# Patient Record
Sex: MALE | Race: WHITE | NOT HISPANIC OR LATINO | Employment: OTHER | URBAN - METROPOLITAN AREA
[De-identification: names, ages, dates, MRNs, and addresses within clinical notes are randomized per-mention and may not be internally consistent; named-entity substitution may affect disease eponyms.]

---

## 2019-10-03 ENCOUNTER — OFFICE VISIT CONVERTED (OUTPATIENT)
Dept: ORTHOPEDIC SURGERY | Facility: CLINIC | Age: 49
End: 2019-10-03
Attending: ORTHOPAEDIC SURGERY

## 2019-10-03 ENCOUNTER — CONVERSION ENCOUNTER (OUTPATIENT)
Dept: ORTHOPEDIC SURGERY | Facility: CLINIC | Age: 49
End: 2019-10-03

## 2019-11-14 ENCOUNTER — OFFICE VISIT CONVERTED (OUTPATIENT)
Dept: ORTHOPEDIC SURGERY | Facility: CLINIC | Age: 49
End: 2019-11-14
Attending: PHYSICIAN ASSISTANT

## 2020-06-25 ENCOUNTER — OFFICE VISIT CONVERTED (OUTPATIENT)
Dept: ORTHOPEDIC SURGERY | Facility: CLINIC | Age: 50
End: 2020-06-25
Attending: ORTHOPAEDIC SURGERY

## 2020-06-25 ENCOUNTER — CONVERSION ENCOUNTER (OUTPATIENT)
Dept: ORTHOPEDIC SURGERY | Facility: CLINIC | Age: 50
End: 2020-06-25

## 2020-08-18 ENCOUNTER — OFFICE VISIT CONVERTED (OUTPATIENT)
Dept: ORTHOPEDIC SURGERY | Facility: CLINIC | Age: 50
End: 2020-08-18
Attending: ORTHOPAEDIC SURGERY

## 2020-09-02 ENCOUNTER — HOSPITAL ENCOUNTER (OUTPATIENT)
Dept: OTHER | Facility: HOSPITAL | Age: 50
Discharge: HOME | End: 2020-09-02
Attending: ORTHOPAEDIC SURGERY

## 2020-09-03 ENCOUNTER — CONVERSION ENCOUNTER (OUTPATIENT)
Dept: ORTHOPEDIC SURGERY | Facility: CLINIC | Age: 50
End: 2020-09-03

## 2020-09-03 ENCOUNTER — OFFICE VISIT CONVERTED (OUTPATIENT)
Dept: ORTHOPEDIC SURGERY | Facility: CLINIC | Age: 50
End: 2020-09-03
Attending: ORTHOPAEDIC SURGERY

## 2021-05-03 ENCOUNTER — HOSPITAL ENCOUNTER (OUTPATIENT)
Dept: LAB | Facility: HOSPITAL | Age: 51
Discharge: HOME | End: 2021-05-03
Attending: NURSE PRACTITIONER

## 2021-05-03 ENCOUNTER — OFFICE VISIT CONVERTED (OUTPATIENT)
Dept: GASTROENTEROLOGY | Facility: CLINIC | Age: 51
End: 2021-05-03
Attending: NURSE PRACTITIONER

## 2021-05-03 LAB
C DIFF TOX B STL QL CT TISS CULT: NEGATIVE
CONV 027 TOXIN: NEGATIVE

## 2021-05-06 LAB — ELASTASE PANC STL-MCNT: 341 UG ELAST./G

## 2021-05-11 NOTE — H&P
History and Physical      Patient Name: Raciel Mancini   Patient ID: [id number]   Sex: Male   YOB: 1970    Primary Care Provider: Mar Oliver MD   Referring Provider: Mar Oliver MD    Visit Date: May 3, 2021    Provider: DINO Vanegas   Location: [facility]   Location Address: [address] [place] [address], [address]  [place], [place]  [id number]   Location Phone: [phone]          Chief Complaint  · Diarrhea      History Of Present Illness  The patient is a 50 year old /White male who presents on referral from Mar Oliver MD for a gastroenterology evaluation.      He presents for evaluation of diarrhea.      Diarrhea has been present for several months.  States that when he eats, has a bowel movement within 20 minutes.  Reports up to four bowel movements per day.  Denies rectal bleeding. States that stool is most like a #5-6 on the Parker stool chart.  Denies abdominal pain.  Denies change in diet and medication since diarrhea started.      Hemorrhoidectomy/colonoscopy - 2017 per Dr. Gaines.  Internal and external hemorrhoids removed.  Normal colonoscopy.     Denies family history of colon cancer.  Weight stable.      Denies GERD and dysphagia.             Past Medical History  GERD; High cholesterol; Hyperlipemia; Rectal bleeding         Past Surgical History  Hemorrhoidectomy         Medication List  Fish Oil oral; meloxicam 7.5 mg oral tablet; simvastatin 20 mg oral tablet         Allergy List  NO KNOWN DRUG ALLERGIES       Allergies Reconciled  Family Medical History  Cancer, Unspecified; Diabetes, unspecified type; Prostate cancer; Bladder calculus; Family history of Arthritis         Social History  Alcohol (Current some day); Alcohol Use (Current some day); Caffeine (Current every day); lives with spouse; .; Recreational Drug Use (Never); Second hand smoke exposure (Never); Tobacco (Former); Working         Review of  "Systems  · Constitutional  o Denies  o : chills, fever  · Eyes  o Denies  o : blurred vision, changes in vision  · Cardiovascular  o Denies  o : chest pain, irregular heart beats  · Respiratory  o Denies  o : shortness of breath, cough  · Gastrointestinal  o Admits  o : See HPI  · Genitourinary  o Denies  o : dysuria, blood in urine  · Integument  o Denies  o : rash, new skin lesions  · Neurologic  o Denies  o : tingling or numbness, seizures  · Musculoskeletal  o Denies  o : joint pain, joint swelling  · Endocrine  o Denies  o : weight gain, weight loss  · Psychiatric  o Denies  o : anxiety, depression      Vitals  Date Time BP Position Site L\R Cuff Size HR RR TEMP (F) WT  HT  BMI kg/m2 BSA m2 O2 Sat FR L/min FiO2 HC       05/03/2021 08:38 /77 Sitting       202lbs 0oz 5'  11\" 28.17 2.14             Physical Examination  · Constitutional  o Appearance  o : well developed, well-nourished, in no acute distress  · Eyes  o Vision  o :   § Visual Fields  § : eyes move symmetrical in all directions  o Sclerae  o : anicteric  o Pupils and Irises  o : pupils equal and symmetrical  · Neck  o Inspection/Palpation  o : supple  · Respiratory  o Respiratory Effort  o : breathing unlabored  o Inspection of Chest  o : normal appearance, no retractions  o Auscultation of Lungs  o : clear to auscultation bilaterally  · Cardiovascular  o Heart  o :   § Auscultation of Heart  § : no murmurs, gallops or rubs  · Gastrointestinal  o Abdominal Examination  o : soft, nontender to palpation, with normal active bowel sounds, no appreciable hepatosplenomegaly  o Digital Rectal Exam  o : deferred  · Lymphatic  o Neck  o : no palpable lymphadenopathy  · Skin and Subcutaneous Tissue  o General Inspection  o : without focal lesions; turgor is normal  · Psychiatric  o General  o : Alert and oriented x3  o Mood and Affect  o : Mood and affect are appropriate to circumstances  · Extremities  o Extremities  o : No edema, no " cyanosis          Assessment  · Diarrhea     787.91/R19.7  · Change in bowel habits     787.99/R19.4      Plan  · Orders  o C difficile Toxigenic Assay (PCR) [facility] (66700) - - 05/03/2021  o Stool culture and sensitivity (45145) - - 05/03/2021  o Fecal Occult Blood Diagnostic (Send to Lab) [facility] (20776) - - 05/03/2021  o Giardia and Cryptosporidium Enzyme Immunoassay [facility] (07842, 71835) - - 05/03/2021  o Lactoferrin (Fecal) Qualitative (40245) - - 05/03/2021  o Pancreatic elastase stool (69553) - - 05/03/2021  · Medications  o Florajen3 460 mg (7.5-6- 1.5 bill. cell) oral capsule   SIG: take 1 capsule by oral route daily for 30 days   DISP: (30) Capsule with 5 refills  Prescribed on 05/03/2021     o Medications have been Reconciled  · Instructions  o Information given on current diagnoses. If stool studies unrevealing, recommend colonoscopy.  · Disposition  o Follow up 3 months  · Referrals  o ID: [id number] Date: 04/30/2021 Type: Inbound  Specialty: Gastroenterology            Electronically Signed by: DINO Vanegas -Author on May 3, 2021 08:55:48 AM

## 2021-05-13 NOTE — PROGRESS NOTES
Progress Note      Patient Name: Raciel Mancini   Patient ID: [id number]   Sex: Male   YOB: 1970        Visit Date: June 25, 2020    Provider: Kurtis Dias MD   Location: [facility]   Location Address: [address] [place] [address]  [id number]   Location Phone: [phone]          Chief Complaint  · Bilateral elbow pain      History Of Present Illness  Raciel Mancini is a 50 year old /White male who presents today to [facility].      The patient presents today for follow up with bilateral elbow pain.   The patient recently had an EMG in February.  He reports numbness and tingling in his left hand in the middle of the night.            Past Medical History  GERD; High cholesterol; Hyperlipemia; Rectal bleeding         Past Surgical History  Hemorrhoidectomy         Medication List  Fish Oil oral; meloxicam 7.5 mg oral tablet; simvastatin 20 mg oral tablet         Allergy List  NO KNOWN DRUG ALLERGIES         Family Medical History  Cancer, Unspecified; Diabetes, unspecified type; Prostate cancer; Bladder calculus; Family history of Arthritis         Social History  Alcohol (Current some day); Alcohol Use (Current some day); Caffeine (Current every day); lives with spouse; .; Recreational Drug Use (Never); Second hand smoke exposure (Never); Tobacco (Former); Working         Review of Systems  · Constitutional  o Denies  o : fever, chills, weight loss  · Cardiovascular  o Denies  o : chest pain, shortness of breath  · Gastrointestinal  o Denies  o : liver disease, heartburn, nausea, blood in stools  · Genitourinary  o Denies  o : painful urination, blood in urine  · Integument  o Denies  o : rash, itching  · Neurologic  o Denies  o : headache, weakness, loss of consciousness  · Musculoskeletal  o Denies  o : painful, swollen joints  · Psychiatric  o Denies  o : drug/alcohol addiction, anxiety, depression      Vitals  Date Time BP Position Site L\R Cuff  "Size HR RR TEMP (F) WT  HT  BMI kg/m2 BSA m2 O2 Sat HC       06/25/2020 03:07 PM         194lbs 16oz 5'  11\" 27.2 2.1           Physical Examination  · Constitutional  o Appearance  o : well developed, well-nourished, no obvious deformities present  · Head and Face  o Head  o :   § Inspection  § : normocephalic  o Face  o :   § Inspection  § : no facial lesions  · Eyes  o Conjunctivae  o : conjunctivae normal  o Sclerae  o : sclerae white  · Ears, Nose, Mouth and Throat  o Ears  o :   § External Ears  § : appearance within normal limits  § Hearing  § : intact  o Nose  o :   § External Nose  § : appearance normal  · Neck  o Inspection/Palpation  o : normal appearance  o Range of Motion  o : full range of motion  · Respiratory  o Respiratory Effort  o : breathing unlabored  o Inspection of Chest  o : normal appearance  o Auscultation of Lungs  o : no audible wheezing or rales  · Cardiovascular  o Heart  o : regular rate  · Gastrointestinal  o Abdominal Examination  o : soft and non-tender  · Skin and Subcutaneous Tissue  o General Inspection  o : intact, no rashes  · Psychiatric  o General  o : Alert and oriented x3  o Judgement and Insight  o : judgment and insight intact  o Mood and Affect  o : mood normal, affect appropriate  · Extremities  o Extremities  o : Bilateral elbows L Tenderness over the lateral upper condyle on the left elbow, sensation is intact, neurovascularly intact. R Sensation is intact  · Injection Note/Aspiration Note  o Site  o : left elbow  o Procedure  o : Procedure: After educating the patient, patient gave consent for procedure. After using Chloraprep, the joint space was injected. The patient tolerated the procedure well.  o Medication  o : 80 mg of DepoMedrol with 1cc of 1% Lidocaine              Assessment  · Carpal Tunnel Syndrome     354.0/G56.00  · Pain of both elbows       Pain in right elbow     719.42/M25.521  Pain in left elbow     719.42/M25.522  · Neuropathy: ulnar " nerve     355.9/G62.9  · Cubital syndrome     726.39/M70.30  · Epicondylitis, bi- lateral     726.32/M77.10      Plan  · Orders  o Depo-Medrol injection 80mg () - - 06/25/2020   Lot [id number] Exp 06 2021 [employer] Administered by MICHEL MARTINEZ MD  o Elbow-Lat Single Tendon (Injection) (09829) - - 06/25/2020   Lot [id number] Exp 07 01 2021 [employer] Administered by MICHEL MARTINEZ MD  · Medications  o Medications have been Reconciled  o Transition of Care or Provider Policy  · Instructions  o Dr. Martinez saw and examined the patient and agrees with plan.   o Reviewed the patient's Past Medical, Social, and Family history as well as the ROS at today's visit, no changes.  o Call or return if worsening symptoms.  o The above service was scribed by Cruz Pitts on my behalf and I attest to the accuracy of the note. jsb  o We discussed the plan with the patient. We discussed doing injections for the tennis elbow, but surgery would be needed to fix his pinched nerves in the left arm. Plan for injection today in the left and the patient will wait a little bit for surgery, until symptoms get worse. The patient will follow up in a week for the right one. Plan for PT as well.  o Electronically Identified Patient Education Materials Provided Electronically  · Referrals  o ID: [id number] Date: 06/25/2020 Type: Inbound  Specialty: Orthopedic Surgery            Electronically Signed by: Xiang Pitts - , Other -Author on June 29, 2020 11:10:29 AM  Electronically Co-signed by: Michel Martinez MD -Reviewer on June 29, 2020 09:33:44 PM

## 2021-05-13 NOTE — PROGRESS NOTES
Progress Note      Patient Name: Raciel Mancini   Patient ID: [id number]   Sex: Male   YOB: 1970        Visit Date: September 3, 2020    Provider: Kurtis Dias MD   Location: [facility]   Location Address: [address] [place] [address]  [id number]   Location Phone: [phone]          Chief Complaint  · Left elbow pain      History Of Present Illness  Raciel Mancini is a 50 year old /White male who presents today to [facility].      The patient presents today for follow-up of left elbow, follow-up MRI. Patient has had left elbow pain over the past 2 years. He has had tried treatments including 3 steroid injections, he states the last injection did not help his elbow pain at all. Patient has been attending physical therapy 3 times per week since his last visit with no relief. Patient takes Aleve occasionally with some relief of his pain.    He reports the elbow continues to be painful over the lateral elbow.       Past Medical History  GERD; High cholesterol; Hyperlipemia; Rectal bleeding         Past Surgical History  Hemorrhoidectomy         Medication List  Fish Oil oral; meloxicam 7.5 mg oral tablet; simvastatin 20 mg oral tablet         Allergy List  NO KNOWN DRUG ALLERGIES       Allergies Reconciled  Family Medical History  Cancer, Unspecified; Diabetes, unspecified type; Prostate Cancer; Bladder calculus; Family history of Arthritis         Social History  Alcohol (Current some day); Alcohol Use (Current some day); Caffeine (Current every day); lives with spouse; .; Recreational Drug Use (Never); Second hand smoke exposure (Never); Tobacco (Former); Working         Review of Systems  · Constitutional  o Denies  o : fever, chills, weight loss  · Cardiovascular  o Denies  o : chest pain, shortness of breath  · Gastrointestinal  o Denies  o : liver disease, heartburn, nausea, blood in stools  · Genitourinary  o Denies  o : painful urination,  "blood in urine  · Integument  o Denies  o : rash, itching  · Neurologic  o Denies  o : headache, weakness, loss of consciousness  · Musculoskeletal  o Denies  o : painful, swollen joints  · Psychiatric  o Denies  o : drug/alcohol addiction, anxiety, depression      Vitals  Date Time BP Position Site L\R Cuff Size HR RR TEMP (F) WT  HT  BMI kg/m2 BSA m2 O2 Sat        09/03/2020 10:11 AM         195lbs 16oz 5'  11\" 27.34 2.11           Physical Examination  · Constitutional  o Appearance  o : well developed, well-nourished, no obvious deformities present  · Head and Face  o Head  o :   § Inspection  § : normocephalic  o Face  o :   § Inspection  § : no facial lesions  · Eyes  o Conjunctivae  o : conjunctivae normal  o Sclerae  o : sclerae white  · Ears, Nose, Mouth and Throat  o Ears  o :   § External Ears  § : appearance within normal limits  § Hearing  § : intact  o Nose  o :   § External Nose  § : appearance normal  · Neck  o Inspection/Palpation  o : normal appearance  o Range of Motion  o : full range of motion  · Respiratory  o Respiratory Effort  o : breathing unlabored  o Inspection of Chest  o : normal appearance  o Auscultation of Lungs  o : no audible wheezing or rales  · Cardiovascular  o Heart  o : regular rate  · Gastrointestinal  o Abdominal Examination  o : soft and non-tender  · Skin and Subcutaneous Tissue  o General Inspection  o : intact, no rashes  · Psychiatric  o General  o : Alert and oriented x3  o Judgement and Insight  o : judgment and insight intact  o Mood and Affect  o : mood normal, affect appropriate  · Left Elbow  o Inspection  o : Tender to palpation of the lateral elbow pain with resisted wrist extension, pain with resisted third finger extension, extension -10, flexion 130, neurovascularly intact, 2+ radial and ulnar pulses   · Imaging  o Imaging  o : [place] [facility] [place]: 1. Moderate common extensor tendinopathy with partial-thickness undersurface tearing at the lateral epicondyle. 2. " Mild partial-thickness sprain of the proximal RCL complex.          Assessment  · Lateral epicondylitis     726.32/M77.10  · Left elbow pain     719.42/M25.522      Plan  · Medications  o Medications have been Reconciled  o Transition of Care or Provider Policy  · Instructions  o Reviewed the patient's Past Medical, Social, and Family history as well as the ROS at today's visit, no changes.  o Call or return if worsening symptoms.  o The above service was scribed by Viridiana Branch on my behalf and I attest to the accuracy of the note. jsb  o We discussed conservative treatment options with the patient. He has tried injections, medications, bracing, etc. He is not interested in surgical intervention. He wished to proceed with a PRP injection and follow-up at our office for this when available.   · Referrals  o ID: [id number] Date: 10/03/2019 Type: Inbound  Specialty: Orthopedic Surgery            Electronically Signed by: Viridiana Branch-, Other -Author on September 3, 2020 10:27:03 AM  Electronically Co-signed by: Kurtis Dias MD -Reviewer on September 3, 2020 10:19:21 PM

## 2021-05-14 VITALS — HEIGHT: 71 IN | WEIGHT: 195.12 LBS | BODY MASS INDEX: 27.31 KG/M2

## 2021-05-14 VITALS
BODY MASS INDEX: 28.28 KG/M2 | HEIGHT: 71 IN | DIASTOLIC BLOOD PRESSURE: 77 MMHG | WEIGHT: 202 LBS | SYSTOLIC BLOOD PRESSURE: 122 MMHG

## 2021-05-14 VITALS — BODY MASS INDEX: 27.44 KG/M2 | HEIGHT: 71 IN | WEIGHT: 196 LBS

## 2021-05-15 VITALS — HEIGHT: 71 IN | WEIGHT: 204.25 LBS | HEART RATE: 83 BPM | OXYGEN SATURATION: 98 % | BODY MASS INDEX: 28.6 KG/M2

## 2021-05-15 VITALS — OXYGEN SATURATION: 97 % | WEIGHT: 197 LBS | HEIGHT: 71 IN | HEART RATE: 74 BPM | BODY MASS INDEX: 27.58 KG/M2

## 2021-05-15 VITALS — WEIGHT: 195 LBS | HEIGHT: 71 IN | BODY MASS INDEX: 27.3 KG/M2

## 2021-06-16 ENCOUNTER — TELEPHONE (OUTPATIENT)
Dept: URGENT CARE | Facility: CLINIC | Age: 51
End: 2021-06-16
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
## 2021-06-16 DIAGNOSIS — S20.361A TICK BITE OF RIGHT SIDE OF CHEST WALL, INITIAL ENCOUNTER: ICD-10-CM
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
## 2021-06-16 PROCEDURE — 0097U HC BIOFIRE FILMARRAY GI PANEL: CPT | Performed by: FAMILY MEDICINE

## 2021-06-16 PROCEDURE — 87493 C DIFF AMPLIFIED PROBE: CPT | Performed by: FAMILY MEDICINE

## 2021-06-16 PROCEDURE — 83630 LACTOFERRIN FECAL (QUAL): CPT | Performed by: FAMILY MEDICINE

## 2021-06-16 PROCEDURE — 86757 RICKETTSIA ANTIBODY: CPT | Performed by: FAMILY MEDICINE

## 2021-06-16 PROCEDURE — 86618 LYME DISEASE ANTIBODY: CPT | Performed by: FAMILY MEDICINE

## 2021-06-16 PROCEDURE — 86666 EHRLICHIA ANTIBODY: CPT | Performed by: FAMILY MEDICINE

## 2021-06-16 NOTE — TELEPHONE ENCOUNTER
Patient called, reports that Lyme titer and RMSF labs performed at another facility had a specimen that clotted and therefore needs to be redrawn.  He prefers to have them performed here and submitted in addition to the lab he had this AM.  Patient understands he may need to come back in for redraw.    (see note addendum too).    Patient was recontacted and will come back for redraw.

## 2021-06-17 LAB
B BURGDOR IGG SER QL: NEGATIVE
B BURGDOR IGM SER QL: NEGATIVE

## 2021-06-21 LAB
R RICKETTSI IGG SER QL IA: POSITIVE
R RICKETTSI IGG TITR SER IF: ABNORMAL {TITER}
R RICKETTSI IGM SER-ACNC: 0.25 INDEX (ref 0–0.89)

## 2021-06-24 ENCOUNTER — TELEPHONE (OUTPATIENT)
Dept: URGENT CARE | Facility: CLINIC | Age: 51
End: 2021-06-24

## 2021-06-24 NOTE — TELEPHONE ENCOUNTER
----- Message from Nadia Martinez MD sent at 6/22/2021 10:34 PM EDT -----  Erlichia result negative too, please notify patient of this final result.

## 2021-06-25 ENCOUNTER — TELEPHONE (OUTPATIENT)
Dept: URGENT CARE | Facility: CLINIC | Age: 51
End: 2021-06-25

## 2021-07-02 ENCOUNTER — HOSPITAL ENCOUNTER (OUTPATIENT)
Facility: HOSPITAL | Age: 51
Setting detail: HOSPITAL OUTPATIENT SURGERY
End: 2021-07-02
Attending: INTERNAL MEDICINE | Admitting: INTERNAL MEDICINE

## 2021-07-02 ENCOUNTER — CLINICAL SUPPORT (OUTPATIENT)
Dept: GASTROENTEROLOGY | Facility: CLINIC | Age: 51
End: 2021-07-02

## 2021-07-02 ENCOUNTER — PREP FOR SURGERY (OUTPATIENT)
Dept: OTHER | Facility: HOSPITAL | Age: 51
End: 2021-07-02

## 2021-07-02 DIAGNOSIS — Z12.11 SCREEN FOR COLON CANCER: Primary | ICD-10-CM

## 2021-07-02 PROBLEM — E78.5 HYPERLIPEMIA: Status: ACTIVE | Noted: 2021-07-02

## 2021-07-02 PROBLEM — K62.5 RECTAL BLEEDING: Status: ACTIVE | Noted: 2021-07-02

## 2021-07-02 RX ORDER — DICLOFENAC SODIUM 75 MG/1
TABLET, DELAYED RELEASE ORAL EVERY 12 HOURS SCHEDULED
COMMUNITY
End: 2021-08-03

## 2021-07-02 RX ORDER — PEG-3350, SODIUM SULFATE, SODIUM CHLORIDE, POTASSIUM CHLORIDE, SODIUM ASCORBATE AND ASCORBIC ACID 7.5-2.691G
1000 KIT ORAL EVERY 12 HOURS
Qty: 1000 ML | Refills: 0 | Status: SHIPPED | OUTPATIENT
Start: 2021-07-02 | End: 2021-09-23 | Stop reason: HOSPADM

## 2021-08-03 ENCOUNTER — OFFICE VISIT (OUTPATIENT)
Dept: GASTROENTEROLOGY | Facility: CLINIC | Age: 51
End: 2021-08-03

## 2021-08-03 VITALS
BODY MASS INDEX: 28.45 KG/M2 | WEIGHT: 204 LBS | HEART RATE: 71 BPM | SYSTOLIC BLOOD PRESSURE: 117 MMHG | DIASTOLIC BLOOD PRESSURE: 78 MMHG

## 2021-08-03 DIAGNOSIS — R19.7 DIARRHEA, UNSPECIFIED TYPE: Primary | ICD-10-CM

## 2021-08-03 DIAGNOSIS — R15.2 FECAL URGENCY: ICD-10-CM

## 2021-08-03 PROCEDURE — 99213 OFFICE O/P EST LOW 20 MIN: CPT | Performed by: NURSE PRACTITIONER

## 2021-08-03 NOTE — PROGRESS NOTES
Chief Complaint  Follow-up (diarrhea)    Raciel Mancini is a 51 y.o. male who presents to [facility] for follow-up of diarrhea.  History of present Illness  He's continuing to have diarrhea, but it's somewhat improved.  States that stool is not formed.  He continues to experience fecal urgecny with meals and coffee.    He has not tried dicyclomine in the past but states that he does not like to take medications unless they are absolutely necessary.  Reports taking Mobic daily for arthritic pain.    He is scheduled for colonoscopy in October to further work-up etiology of diarrhea.      Past Medical History:   Diagnosis Date   • Arthritis    • GERD (gastroesophageal reflux disease)    • High cholesterol    • Hyperlipemia    • Hyperlipidemia    • Rectal bleeding        Past Surgical History:   Procedure Laterality Date   • COLONOSCOPY     • HEMORRHOIDECTOMY           Current Outpatient Medications:   •  meloxicam (MOBIC) 7.5 MG tablet, meloxicam 7.5 mg oral tablet take 1 tablet (7.5 mg) by oral route once daily   Active, Disp: , Rfl:   •  Omega-3 Fatty Acids (fish oil) 1000 MG capsule capsule, Take  by mouth., Disp: , Rfl:   •  PEG-KCl-NaCl-NaSulf-Na Asc-C (MoviPrep) 100 g reconstituted solution powder, Take 1,000 mL by mouth Every 12 (Twelve) Hours., Disp: 1000 mL, Rfl: 0  •  simvastatin (ZOCOR) 20 MG tablet, , Disp: , Rfl:      No Known Allergies    Family History   Problem Relation Age of Onset   • Diabetes Mother    • Other Mother         Bladder stones   • Arthritis Mother    • Prostate cancer Father    • Arthritis Father    • Cancer Brother    • Diabetes Brother    • Arthritis Brother    • Prostate cancer Paternal Grandfather         Social History     Social History Narrative   • Not on file       Objective     Review of Systems   Constitutional: Negative for fever and unexpected weight loss.   Respiratory: Negative for cough and shortness of breath.    Cardiovascular:  Negative for chest pain and palpitations.   Gastrointestinal:        See HPI   Neurological: Negative for weakness and confusion.        Vital Signs:   /78 (BP Location: Left arm, Patient Position: Sitting, Cuff Size: Large Adult)   Pulse 71   Wt 92.5 kg (204 lb)   BMI 28.45 kg/m²       Physical Exam  Constitutional:       General: He is not in acute distress.     Appearance: Normal appearance. He is well-developed and normal weight.   HENT:      Head: Normocephalic and atraumatic.   Eyes:      Conjunctiva/sclera: Conjunctivae normal.      Pupils: Pupils are equal, round, and reactive to light.      Visual Fields: Right eye visual fields normal and left eye visual fields normal.   Cardiovascular:      Rate and Rhythm: Normal rate and regular rhythm.      Heart sounds: Normal heart sounds.   Pulmonary:      Effort: Pulmonary effort is normal. No retractions.      Breath sounds: Normal breath sounds and air entry.   Abdominal:      General: Bowel sounds are normal.      Palpations: Abdomen is soft.      Tenderness: There is no abdominal tenderness.      Comments: No appreciable hepatosplenomegaly or ascites   Musculoskeletal:         General: Normal range of motion.      Cervical back: Neck supple.      Right lower leg: No edema.      Left lower leg: No edema.   Lymphadenopathy:      Cervical: No cervical adenopathy.   Skin:     General: Skin is warm and dry.      Findings: No lesion.   Neurological:      General: No focal deficit present.      Mental Status: He is alert and oriented to person, place, and time.   Psychiatric:         Mood and Affect: Mood and affect normal.         Behavior: Behavior normal.         Result Review :   The following data was reviewed by: DINO Christie on 08/03/2021:  Gastrointestinal Panel, PCR - Stool, Per Rectum (06/16/2021 11:32)  Clostridium Difficile Toxin, PCR - , (05/03/2021 11:39)  Pancreatic Elastase, Fecal - , (05/03/2021 11:39)  Ehrlichia Antibody Panel  (06/16/2021 11:31)  Specimen Status Report (06/16/2021 11:31)  Ehrlichia Antibody Panel (06/16/2021 11:31)  Clostridium Difficile Toxin - Stool, Per Rectum (06/16/2021 11:32)  Fecal Lactoferrin - Stool, Per Rectum (06/16/2021 11:32)                     Assessment and Plan    Diagnoses and all orders for this visit:    1. Diarrhea, unspecified type (Primary)    2. Fecal urgency      Discussed with patient recommendation to discontinue Mobic if possible as this could be causing diarrhea.  Also discussed daily fiber supplement.  Patient would like to try discontinuing NSAIDs first.  Will await upcoming colonoscopy.          Follow Up   Return for F/U after procedure.  Patient was given instructions and counseling regarding his condition or for health maintenance advice. Please see specific information pulled into the AVS if appropriate.

## 2021-08-11 ENCOUNTER — TELEPHONE (OUTPATIENT)
Dept: GASTROENTEROLOGY | Facility: CLINIC | Age: 51
End: 2021-08-11

## 2021-09-07 ENCOUNTER — TELEPHONE (OUTPATIENT)
Dept: GASTROENTEROLOGY | Facility: CLINIC | Age: 51
End: 2021-09-07

## 2021-09-07 NOTE — TELEPHONE ENCOUNTER
Due to covid, patient will be added to a list and will need to be rescheduled at a later time. Patient informed of this information.”

## 2021-09-08 ENCOUNTER — TELEPHONE (OUTPATIENT)
Dept: GASTROENTEROLOGY | Facility: CLINIC | Age: 51
End: 2021-09-08

## 2021-09-08 NOTE — TELEPHONE ENCOUNTER
Due to covid, patient will be added to a list and will need to be rescheduled at a later time. Patient informed of this information.

## 2021-09-14 ENCOUNTER — PREP FOR SURGERY (OUTPATIENT)
Dept: OTHER | Facility: HOSPITAL | Age: 51
End: 2021-09-14

## 2021-09-14 DIAGNOSIS — K62.5 RECTAL BLEEDING: ICD-10-CM

## 2021-09-14 DIAGNOSIS — R19.7 DIARRHEA, UNSPECIFIED TYPE: ICD-10-CM

## 2021-09-16 PROBLEM — R19.7 DIARRHEA: Status: ACTIVE | Noted: 2021-09-16

## 2021-09-23 ENCOUNTER — HOSPITAL ENCOUNTER (OUTPATIENT)
Facility: HOSPITAL | Age: 51
Setting detail: HOSPITAL OUTPATIENT SURGERY
Discharge: HOME | End: 2021-09-23
Attending: INTERNAL MEDICINE | Admitting: INTERNAL MEDICINE

## 2021-09-23 ENCOUNTER — ANESTHESIA (OUTPATIENT)
Dept: GASTROENTEROLOGY | Facility: HOSPITAL | Age: 51
End: 2021-09-23

## 2021-09-23 ENCOUNTER — ANESTHESIA EVENT (OUTPATIENT)
Dept: GASTROENTEROLOGY | Facility: HOSPITAL | Age: 51
End: 2021-09-23

## 2021-09-23 VITALS
DIASTOLIC BLOOD PRESSURE: 73 MMHG | BODY MASS INDEX: 27.92 KG/M2 | WEIGHT: 200.18 LBS | RESPIRATION RATE: 17 BRPM | TEMPERATURE: 97 F | OXYGEN SATURATION: 97 % | SYSTOLIC BLOOD PRESSURE: 97 MMHG | HEART RATE: 66 BPM

## 2021-09-23 DIAGNOSIS — K62.5 RECTAL BLEEDING: ICD-10-CM

## 2021-09-23 DIAGNOSIS — R19.7 DIARRHEA, UNSPECIFIED TYPE: ICD-10-CM

## 2021-09-23 PROCEDURE — 88305 TISSUE EXAM BY PATHOLOGIST: CPT | Performed by: INTERNAL MEDICINE

## 2021-09-23 PROCEDURE — 45380 COLONOSCOPY AND BIOPSY: CPT | Performed by: INTERNAL MEDICINE

## 2021-09-23 PROCEDURE — 25010000002 PROPOFOL 10 MG/ML EMULSION: Performed by: ANESTHESIOLOGY

## 2021-09-23 RX ORDER — SODIUM CHLORIDE, SODIUM LACTATE, POTASSIUM CHLORIDE, CALCIUM CHLORIDE 600; 310; 30; 20 MG/100ML; MG/100ML; MG/100ML; MG/100ML
30 INJECTION, SOLUTION INTRAVENOUS CONTINUOUS
Status: DISCONTINUED | OUTPATIENT
Start: 2021-09-23 | End: 2021-09-23 | Stop reason: HOSPADM

## 2021-09-23 RX ORDER — PROPOFOL 10 MG/ML
VIAL (ML) INTRAVENOUS AS NEEDED
Status: DISCONTINUED | OUTPATIENT
Start: 2021-09-23 | End: 2021-09-23 | Stop reason: SURG

## 2021-09-23 RX ORDER — LIDOCAINE HYDROCHLORIDE 20 MG/ML
INJECTION, SOLUTION INFILTRATION; PERINEURAL AS NEEDED
Status: DISCONTINUED | OUTPATIENT
Start: 2021-09-23 | End: 2021-09-23 | Stop reason: SURG

## 2021-09-23 RX ADMIN — SODIUM CHLORIDE, POTASSIUM CHLORIDE, SODIUM LACTATE AND CALCIUM CHLORIDE 30 ML/HR: 600; 310; 30; 20 INJECTION, SOLUTION INTRAVENOUS at 10:07

## 2021-09-23 RX ADMIN — PROPOFOL 100 MG: 10 INJECTION, EMULSION INTRAVENOUS at 10:23

## 2021-09-23 RX ADMIN — PROPOFOL 200 MCG/KG/MIN: 10 INJECTION, EMULSION INTRAVENOUS at 10:23

## 2021-09-23 RX ADMIN — LIDOCAINE HYDROCHLORIDE 30 MG: 20 INJECTION, SOLUTION INFILTRATION; PERINEURAL at 10:23

## 2021-09-23 NOTE — ANESTHESIA PREPROCEDURE EVALUATION
Anesthesia Evaluation     Patient summary reviewed and Nursing notes reviewed   no history of anesthetic complications:  NPO Solid Status: > 8 hours  NPO Liquid Status: > 2 hours           Airway   Mallampati: II  TM distance: >3 FB  Neck ROM: full  No difficulty expected  Dental - normal exam     Pulmonary - normal exam   (+) a smoker Former,   Cardiovascular - normal exam  Exercise tolerance: good (4-7 METS)    (+) hyperlipidemia,       Neuro/Psych- negative ROS  GI/Hepatic/Renal/Endo    (+)  GERD, GI bleeding lower ,     Musculoskeletal     Abdominal  - normal exam    Bowel sounds: normal.   Substance History - negative use     OB/GYN negative ob/gyn ROS         Other   arthritis,                      Anesthesia Plan    ASA 2     general   total IV anesthesia  intravenous induction     Anesthetic plan, all risks, benefits, and alternatives have been provided, discussed and informed consent has been obtained with: patient.

## 2021-09-23 NOTE — ANESTHESIA POSTPROCEDURE EVALUATION
Patient: Raciel Mancini    Procedure Summary     Date: 09/23/21 Room / Location: [facility] ENDOSCOPY 4 / [facility] ENDOSCOPY    Anesthesia Start: 1019 Anesthesia Stop: 1047    Procedure: COLONOSCOPY (N/A ) Diagnosis:       Diarrhea, unspecified type      Rectal bleeding      (Diarrhea, unspecified type [R19.7])      (Rectal bleeding [K62.5])    Surgeons: Hanane Tapia MD Provider: Berhane Morales MD    Anesthesia Type: general ASA Status: 2          Anesthesia Type: general    Vitals  Vitals Value Taken Time   BP 97/73 09/23/21 1100   Temp 36.1 °C (97 °F) 09/23/21 1100   Pulse 78 09/23/21 1101   Resp 17 09/23/21 1100   SpO2 96 % 09/23/21 1101   Vitals shown include unvalidated device data.        Post Anesthesia Care and Evaluation    Patient location during evaluation: PHASE II  Patient participation: complete - patient participated  Level of consciousness: awake and alert  Pain score: 0  Pain management: adequate  Airway patency: patent  Anesthetic complications: No anesthetic complications  PONV Status: none  Cardiovascular status: acceptable  Respiratory status: acceptable  Hydration status: acceptable  No anesthesia care post op

## 2021-09-23 NOTE — H&P
Pre Procedure History & Physical    Chief Complaint:   diarrhea    Subjective     HPI:   50 yo M here for eval of diarrhea.    Past Medical History:   Past Medical History:   Diagnosis Date   • Arthritis    • Diarrhea    • GERD (gastroesophageal reflux disease)    • High cholesterol    • Hyperlipemia    • Hyperlipidemia    • Nerve damage     bilateral wrists   • Rectal bleeding        Past Surgical History:  Past Surgical History:   Procedure Laterality Date   • COLONOSCOPY     • HEMORRHOIDECTOMY         Family History:  Family History   Problem Relation Age of Onset   • Diabetes Mother    • Other Mother         Bladder stones   • Arthritis Mother    • Prostate cancer Father    • Arthritis Father    • Cancer Brother    • Diabetes Brother    • Arthritis Brother    • Prostate cancer Paternal Grandfather        Social History:   reports that he has quit smoking. He has never used smokeless tobacco. He reports current alcohol use of about 1.0 standard drinks of alcohol per week. He reports that he does not use drugs.    Medications:   Medications Prior to Admission   Medication Sig Dispense Refill Last Dose   • Omega-3 Fatty Acids (fish oil) 1000 MG capsule capsule Take 1,000 mg by mouth Daily With Breakfast.   Unknown at Unknown time   • PEG-KCl-NaCl-NaSulf-Na Asc-C (MoviPrep) 100 g reconstituted solution powder Take 1,000 mL by mouth Every 12 (Twelve) Hours. 1000 mL 0 Unknown at Unknown time   • simvastatin (ZOCOR) 20 MG tablet Take 20 mg by mouth Every Night.   Unknown at Unknown time       Allergies:  Patient has no known allergies.    ROS:    Pertinent items are noted in HPI     Objective     Weight 90.8 kg (200 lb 2.8 oz).    Physical Exam   Constitutional: Pt is oriented to person, place, and time and well-developed, well-nourished, and in no distress.   Mouth/Throat: Oropharynx is clear and moist.   Neck: Normal range of motion.   Cardiovascular: Normal rate, regular rhythm and normal heart sounds.     Pulmonary/Chest: Effort normal and breath sounds normal.   Abdominal: Soft. Nontender  Skin: Skin is warm and dry.   Psychiatric: Mood, memory, affect and judgment normal.     Assessment/Plan     Diagnosis:  Diarrhea    Anticipated Surgical Procedure:  Colonoscopy    The risks, benefits, and alternatives of this procedure have been discussed with the patient or the responsible party- the patient understands and agrees to proceed.

## 2021-09-24 LAB
CYTO UR: NORMAL
LAB AP CASE REPORT: NORMAL
LAB AP CLINICAL INFORMATION: NORMAL
PATH REPORT.FINAL DX SPEC: NORMAL
PATH REPORT.GROSS SPEC: NORMAL

## 2021-09-29 ENCOUNTER — TELEPHONE (OUTPATIENT)
Dept: GASTROENTEROLOGY | Facility: CLINIC | Age: 51
End: 2021-09-29

## 2021-09-29 NOTE — TELEPHONE ENCOUNTER
----- Message from Estela Natino sent at 9/29/2021  9:10 AM EDT -----    ----- Message -----  From: Hanane Tapia MD  Sent: 9/28/2021   9:05 PM EDT  To: Estela Nation    Recall colonoscopy in 10 years.    Normal colon biopsies.  Please arrange f/u appointment.

## 2021-09-29 NOTE — TELEPHONE ENCOUNTER
Since colonoscopy and stool studies were all normal, I feel that diarrhea is likely related to IBS-D.  Has he tried dicyclomine that was previously prescribed?  If so, would recommend that we begin him on Colestid to help with diarrhea.

## 2021-09-29 NOTE — TELEPHONE ENCOUNTER
Patient notified of results and placed in recall system.  He is concerned because he is still having a lot of diarrhea, he states that any time he eats within 20-30 minutes he has diarrhea. Please advise.

## 2021-09-30 RX ORDER — DICYCLOMINE HCL 20 MG
20 TABLET ORAL EVERY 6 HOURS PRN
Qty: 120 TABLET | Refills: 1 | Status: SHIPPED | OUTPATIENT
Start: 2021-09-30

## 2022-02-24 ENCOUNTER — OFFICE VISIT (OUTPATIENT)
Dept: GASTROENTEROLOGY | Facility: CLINIC | Age: 52
End: 2022-02-24

## 2022-02-24 ENCOUNTER — LAB (OUTPATIENT)
Dept: LAB | Facility: HOSPITAL | Age: 52
End: 2022-02-24

## 2022-02-24 VITALS
BODY MASS INDEX: 29.12 KG/M2 | HEIGHT: 71 IN | DIASTOLIC BLOOD PRESSURE: 73 MMHG | HEART RATE: 70 BPM | WEIGHT: 208 LBS | SYSTOLIC BLOOD PRESSURE: 124 MMHG

## 2022-02-24 DIAGNOSIS — K58.0 IRRITABLE BOWEL SYNDROME WITH DIARRHEA: ICD-10-CM

## 2022-02-24 DIAGNOSIS — R15.2 FECAL URGENCY: ICD-10-CM

## 2022-02-24 DIAGNOSIS — K58.0 IRRITABLE BOWEL SYNDROME WITH DIARRHEA: Primary | ICD-10-CM

## 2022-02-24 PROCEDURE — 86258 DGP ANTIBODY EACH IG CLASS: CPT

## 2022-02-24 PROCEDURE — 99214 OFFICE O/P EST MOD 30 MIN: CPT | Performed by: NURSE PRACTITIONER

## 2022-02-24 PROCEDURE — 36415 COLL VENOUS BLD VENIPUNCTURE: CPT

## 2022-02-24 PROCEDURE — 82784 ASSAY IGA/IGD/IGG/IGM EACH: CPT

## 2022-02-24 PROCEDURE — 86364 TISS TRNSGLTMNASE EA IG CLAS: CPT

## 2022-02-24 NOTE — PATIENT INSTRUCTIONS
Low-FODMAP Eating Plan    FODMAP stands for fermentable oligosaccharides, disaccharides, monosaccharides, and polyols. These are sugars that are hard for some people to digest. A low-FODMAP eating plan may help some people who have irritable bowel syndrome (IBS) and certain other bowel (intestinal) diseases to manage their symptoms.  This meal plan can be complicated to follow. Work with a diet and nutrition specialist (dietitian) to make a low-FODMAP eating plan that is right for you. A dietitian can help make sure that you get enough nutrition from this diet.  What are tips for following this plan?  Reading food labels  · Check labels for hidden FODMAPs such as:  ? High-fructose syrup.  ? Honey.  ? Agave.  ? Natural fruit flavors.  ? Onion or garlic powder.  · Choose low-FODMAP foods that contain 3-4 grams of fiber per serving.  · Check food labels for serving sizes. Eat only one serving at a time to make sure FODMAP levels stay low.  Shopping  · Shop with a list of foods that are recommended on this diet and make a meal plan.  Meal planning  · Follow a low-FODMAP eating plan for up to 6 weeks, or as told by your health care provider or dietitian.  · To follow the eating plan:  1. Eliminate high-FODMAP foods from your diet completely. Choose only low-FODMAP foods to eat. You will do this for 2-6 weeks.  2. Gradually reintroduce high-FODMAP foods into your diet one at a time. Most people should wait a few days before introducing the next new high-FODMAP food into their meal plan. Your dietitian can recommend how quickly you may reintroduce foods.  3. Keep a daily record of what and how much you eat and drink. Make note of any symptoms that you have after eating.  4. Review your daily record with a dietitian regularly to identify which foods you can eat and which foods you should avoid.  General tips  · Drink enough fluid each day to keep your urine pale yellow.  · Avoid processed foods. These often have added sugar  "and may be high in FODMAPs.  · Avoid most dairy products, whole grains, and sweeteners.  · Work with a dietitian to make sure you get enough fiber in your diet.  · Avoid high FODMAP foods at meals to manage symptoms.  Recommended foods  Fruits  Bananas, oranges, tangerines, aarti, limes, blueberries, raspberries, strawberries, grapes, cantaloupe, honeydew melon, kiwi, papaya, passion fruit, and pineapple. Limited amounts of dried cranberries, banana chips, and shredded coconut.  Vegetables  Eggplant, zucchini, cucumber, peppers, green beans, bean sprouts, lettuce, arugula, kale, Swiss chard, spinach, satnam greens, bok barbra, summer squash, potato, and tomato. Limited amounts of corn, carrot, and sweet potato. Green parts of scallions.  Grains  Gluten-free grains, such as rice, oats, buckwheat, quinoa, corn, polenta, and millet. Gluten-free pasta, bread, or cereal. Rice noodles. Corn tortillas.  Meats and other proteins  Unseasoned beef, pork, poultry, or fish. Eggs. [place]. Tofu (firm) and tempeh. Limited amounts of nuts and seeds, such as almonds, walnuts, brazil nuts, pecans, peanuts, nut butters, pumpkin seeds, laura seeds, and sunflower seeds.  Dairy  Lactose-free milk, yogurt, and kefir. Lactose-free cottage cheese and ice cream. Non-dairy milks, such as almond, coconut, hemp, and rice milk. Non-dairy yogurt. Limited amounts of goat cheese, brie, mozzarella, parmesan, swiss, and other hard cheeses.  Fats and oils  Butter-free spreads. Vegetable oils, such as olive, canola, and sunflower oil.  Seasoning and other foods  Artificial sweeteners with names that do not end in \"ol,\" such as aspartame, saccharine, and stevia. Maple syrup, white table sugar, raw sugar, brown sugar, and molasses. Mayonnaise, soy sauce, and tamari. Fresh basil, coriander, parsley, rosemary, and thyme.  Beverages  Water and mineral water. Sugar-sweetened soft drinks. Small amounts of orange juice or cranberry juice. Black and green tea. " Most dry blu. Coffee.  The items listed above may not be a complete list of foods and beverages you can eat. Contact a dietitian for more information.  Foods to avoid  Fruits  Fresh, dried, and juiced forms of apple, pear, watermelon, peach, plum, cherries, apricots, blackberries, boysenberries, figs, nectarines, and macey. Avocado.  Vegetables  Chicory root, artichoke, asparagus, cabbage, snow peas, [place] sprouts, broccoli, sugar snap peas, mushrooms, celery, and cauliflower. Onions, garlic, leeks, and the white part of scallions.  Grains  Wheat, including kamut, durum, and semolina. Barley and bulgur. Couscous. Wheat-based cereals. Wheat noodles, bread, crackers, and pastries.  Meats and other proteins  Fried or fatty meat. Sausage. Cashews and pistachios. Soybeans, baked beans, black beans, chickpeas, kidney beans, daniele beans, navy beans, lentils, black-eyed peas, and split peas.  Dairy  Milk, yogurt, ice cream, and soft cheese. Cream and sour cream. Milk-based sauces. Custard. Buttermilk. Soy milk.  Seasoning and other foods  Any sugar-free gum or candy. Foods that contain artificial sweeteners such as sorbitol, mannitol, isomalt, or xylitol. Foods that contain honey, high-fructose corn syrup, or agave. Bouillon, vegetable stock, beef stock, and chicken stock. Garlic and onion powder. Condiments made with onion, such as hummus, chutney, pickles, relish, salad dressing, and salsa. Tomato paste.  Beverages  Chicory-based drinks. Coffee substitutes. Chamomile tea. Fennel tea. Sweet or fortified blu such as port or padilla. Diet soft drinks made with isomalt, mannitol, maltitol, sorbitol, or xylitol. Apple, pear, and macey juice. Juices with high-fructose corn syrup.  The items listed above may not be a complete list of foods and beverages you should avoid. Contact a dietitian for more information.  Summary  · FODMAP stands for fermentable oligosaccharides, disaccharides, monosaccharides, and polyols. These  are sugars that are hard for some people to digest.  · A low-FODMAP eating plan is a short-term diet that helps to ease symptoms of certain bowel diseases.  · The eating plan usually lasts up to 6 weeks. After that, high-FODMAP foods are reintroduced gradually and one at a time. This can help you find out which foods may be causing symptoms.  · A low-FODMAP eating plan can be complicated. It is best to work with a dietitian who has experience with this type of plan.  This information is not intended to replace advice given to you by your health care provider. Make sure you discuss any questions you have with your health care provider.  Document Revised: 05/06/2021 Document Reviewed: 05/06/2021  Elsevier Patient Education © 2021 [employer]

## 2022-02-24 NOTE — PROGRESS NOTES
Chief Complaint  Diarrhea    Raciel Mancini is a 51 y.o. male who presents to [facility] for follow-up of diarrhea and fecal urgency.      History of present Illness    Colonoscopy performed on 9/23/2021-Terminal ileum and colon were normal. Random colon biopsy negative for microscopic colitis.    He is continuing to experience diarrhea and gas.  He's taking dicyclomine prior to meals and continues to have fecal urgency following meals and/or coffee.  He has a bowel movement 2-3 times per day.  Describes stool as a #7 on the bristol stool chart.      Hasn't noticed any food correlation.         Past Medical History:   Diagnosis Date   • Arthritis    • Diarrhea    • GERD (gastroesophageal reflux disease)    • High cholesterol    • Hyperlipemia    • Hyperlipidemia    • Nerve damage     bilateral wrists   • Rectal bleeding        Past Surgical History:   Procedure Laterality Date   • COLONOSCOPY     • COLONOSCOPY N/A 9/23/2021    Procedure: COLONOSCOPY;  Surgeon: Hanane Tapia MD;  Location: [facility] END[facility]COPY;  Service: Gastroenterology;  Laterality: N/A;  NORMAL COLONOSCOPY   • HEMORRHOIDECTOMY           Current Outpatient Medications:   •  dicyclomine (BENTYL) 20 MG tablet, Take 1 tablet by mouth Every 6 (Six) Hours As Needed (abdominal pain and diarrhea)., Disp: 120 tablet, Rfl: 1  •  Omega-3 Fatty Acids (fish oil) 1000 MG capsule capsule, Take 1,000 mg by mouth Daily With Breakfast., Disp: , Rfl:   •  simvastatin (ZOCOR) 20 MG tablet, Take 20 mg by mouth Every Night., Disp: , Rfl:   •  cholestyramine light 4 g powder, Take 1 packet by mouth 2 (Two) Times a Day. mixed with a liquid, Disp: 378 g, Rfl: 3     No Known Allergies    Family History   Problem Relation Age of Onset   • Diabetes Mother    • Other Mother         Bladder stones   • Arthritis Mother    • Prostate cancer Father    • Arthritis Father    • Cancer Brother    • Diabetes Brother    • Arthritis Brother   "  • Prostate cancer Paternal Grandfather         Social History     Social History Narrative   • Not on file       Objective       Vital Signs:   /73 (BP Location: Left arm, Patient Position: Sitting, Cuff Size: Adult)   Pulse 70   Ht 180.3 cm (71\")   Wt 94.3 kg (208 lb)   BMI 29.01 kg/m²       Physical Exam  Constitutional:       General: He is not in acute distress.     Appearance: Normal appearance. He is well-developed and normal weight.   HENT:      Head: Normocephalic and atraumatic.   Eyes:      Conjunctiva/sclera: Conjunctivae normal.      Pupils: Pupils are equal, round, and reactive to light.      Visual Fields: Right eye visual fields normal and left eye visual fields normal.   Cardiovascular:      Rate and Rhythm: Normal rate and regular rhythm.      Heart sounds: Normal heart sounds.   Pulmonary:      Effort: Pulmonary effort is normal. No retractions.      Breath sounds: Normal breath sounds and air entry.   Abdominal:      General: Bowel sounds are normal.      Palpations: Abdomen is soft.      Tenderness: There is no abdominal tenderness.      Comments: No appreciable hepatosplenomegaly or ascites   Musculoskeletal:         General: Normal range of motion.      Cervical back: Neck supple.      Right lower leg: No edema.      Left lower leg: No edema.   Lymphadenopathy:      Cervical: No cervical adenopathy.   Skin:     General: Skin is warm and dry.      Findings: No lesion.   Neurological:      General: No focal deficit present.      Mental Status: He is alert and oriented to person, place, and time.   Psychiatric:         Mood and Affect: Mood and affect normal.         Behavior: Behavior normal.         Result Review :                           Assessment and Plan    Diagnoses and all orders for this visit:    1. Irritable bowel syndrome with diarrhea (Primary)  -     Celiac Panel Reflex To Titer; Future  -     cholestyramine light 4 g powder; Take 1 packet by mouth 2 (Two) Times a Day. " mixed with a liquid  Dispense: 378 g; Refill: 3    2. Fecal urgency      Check celiac panel.  Previous stool studies negative for C. difficile, bacteria and with normal pancreatic function.  If no improvement with addition of cholestyramine, consider probiotics and Xifaxan.        Follow Up   Return in about 4 months (around 6/24/2022) for IBS.  Patient was given instructions and counseling regarding his condition or for health maintenance advice. Please see specific information pulled into the AVS if appropriate.

## 2022-02-25 LAB
GLIADIN PEPTIDE IGA SER-ACNC: 4 UNITS (ref 0–19)
IGA SERPL-MCNC: 167 MG/DL (ref 90–386)
TTG IGA SER-ACNC: <2 U/ML (ref 0–3)

## 2022-02-28 ENCOUNTER — TELEPHONE (OUTPATIENT)
Dept: GASTROENTEROLOGY | Facility: CLINIC | Age: 52
End: 2022-02-28

## 2022-03-01 ENCOUNTER — TELEPHONE (OUTPATIENT)
Dept: GASTROENTEROLOGY | Facility: CLINIC | Age: 52
End: 2022-03-01

## 2022-03-01 NOTE — TELEPHONE ENCOUNTER
"Patient went to  cholestyramine powder and they are out of the \"light\" they only have the regular he didn't know what the difference was or if that would be ok.  "

## 2022-07-14 ENCOUNTER — OFFICE VISIT (OUTPATIENT)
Dept: GASTROENTEROLOGY | Facility: CLINIC | Age: 52
End: 2022-07-14

## 2022-07-14 VITALS
BODY MASS INDEX: 28.42 KG/M2 | HEART RATE: 68 BPM | HEIGHT: 71 IN | SYSTOLIC BLOOD PRESSURE: 117 MMHG | WEIGHT: 203 LBS | DIASTOLIC BLOOD PRESSURE: 75 MMHG

## 2022-07-14 DIAGNOSIS — K58.0 IRRITABLE BOWEL SYNDROME WITH DIARRHEA: Primary | ICD-10-CM

## 2022-07-14 PROCEDURE — 99213 OFFICE O/P EST LOW 20 MIN: CPT | Performed by: NURSE PRACTITIONER

## 2022-07-14 NOTE — PATIENT INSTRUCTIONS
Low-FODMAP Eating Plan    FODMAP stands for fermentable oligosaccharides, disaccharides, monosaccharides, and polyols. These are sugars that are hard for some people to digest. A low-FODMAP eating plan may help some people who have irritable bowel syndrome (IBS) and certain other bowel (intestinal) diseases to manage their symptoms.  This meal plan can be complicated to follow. Work with a diet and nutrition specialist (dietitian) to make a low-FODMAP eating plan that is right for you. A dietitian can help make sure that you get enough nutrition from this diet.  What are tips for following this plan?  Reading food labels  Check labels for hidden FODMAPs such as:  High-fructose syrup.  Honey.  Agave.  Natural fruit flavors.  Onion or garlic powder.  Choose low-FODMAP foods that contain 3-4 grams of fiber per serving.  Check food labels for serving sizes. Eat only one serving at a time to make sure FODMAP levels stay low.  Shopping  Shop with a list of foods that are recommended on this diet and make a meal plan.  Meal planning  Follow a low-FODMAP eating plan for up to 6 weeks, or as told by your health care provider or dietitian.  To follow the eating plan:  Eliminate high-FODMAP foods from your diet completely. Choose only low-FODMAP foods to eat. You will do this for 2-6 weeks.  Gradually reintroduce high-FODMAP foods into your diet one at a time. Most people should wait a few days before introducing the next new high-FODMAP food into their meal plan. Your dietitian can recommend how quickly you may reintroduce foods.  Keep a daily record of what and how much you eat and drink. Make note of any symptoms that you have after eating.  Review your daily record with a dietitian regularly to identify which foods you can eat and which foods you should avoid.  General tips  Drink enough fluid each day to keep your urine pale yellow.  Avoid processed foods. These often have added sugar and may be high in FODMAPs.  Avoid  "most dairy products, whole grains, and sweeteners.  Work with a dietitian to make sure you get enough fiber in your diet.  Avoid high FODMAP foods at meals to manage symptoms.  Recommended foods  Fruits  Bananas, oranges, tangerines, aarti, limes, blueberries, raspberries, strawberries, grapes, cantaloupe, honeydew melon, kiwi, papaya, passion fruit, and pineapple. Limited amounts of dried cranberries, banana chips, and shredded coconut.  Vegetables  Eggplant, zucchini, cucumber, peppers, green beans, bean sprouts, lettuce, arugula, kale, Swiss chard, spinach, [place]nam greens, bok barbra, summer squash, potato, and tomato. Limited amounts of corn, carrot, and sweet potato. Green parts of scallions.  Grains  Gluten-free grains, such as rice, oats, buckwheat, quinoa, corn, polenta, and millet. Gluten-free pasta, bread, or cereal. Rice noodles. Corn tortillas.  Meats and other proteins  Unseasoned beef, pork, poultry, or fish. Eggs. [place]. Tofu (firm) and tempeh. Limited amounts of nuts and seeds, such as almonds, walnuts, brazil nuts, pecans, peanuts, nut butters, pumpkin seeds, laura seeds, and sunflower seeds.  Dairy  Lactose-free milk, yogurt, and kefir. Lactose-free cottage cheese and ice cream. Non-dairy milks, such as almond, coconut, hemp, and rice milk. Non-dairy yogurt. Limited amounts of goat cheese, brie, mozzarella, parmesan, swiss, and other hard cheeses.  Fats and oils  Butter-free spreads. Vegetable oils, such as olive, canola, and sunflower oil.  Seasoning and other foods  Artificial sweeteners with names that do not end in \"ol,\" such as aspartame, saccharine, and stevia. Maple syrup, white table sugar, raw sugar, brown sugar, and molasses. Mayonnaise, soy sauce, and tamari. Fresh basil, coriander, parsley, rosemary, and thyme.  Beverages  Water and mineral water. Sugar-sweetened soft drinks. Small amounts of orange juice or cranberry juice. Black and green tea. Most dry blu. Coffee.  The items listed " above may not be a complete list of foods and beverages you can eat. Contact a dietitian for more information.  Foods to avoid  Fruits  Fresh, dried, and juiced forms of apple, pear, watermelon, peach, plum, cherries, apricots, blackberries, boysenberries, figs, nectarines, and macey. Avocado.  Vegetables  Chicory root, artichoke, asparagus, cabbage, snow peas, [place] sprouts, broccoli, sugar snap peas, mushrooms, celery, and cauliflower. Onions, garlic, leeks, and the white part of scallions.  Grains  Wheat, including kamut, durum, and semolina. Barley and bulgur. Couscous. Wheat-based cereals. Wheat noodles, bread, crackers, and pastries.  Meats and other proteins  Fried or fatty meat. Sausage. Cashews and pistachios. Soybeans, baked beans, black beans, chickpeas, kidney beans, daniele beans, navy beans, lentils, black-eyed peas, and split peas.  Dairy  Milk, yogurt, ice cream, and soft cheese. Cream and sour cream. Milk-based sauces. Custard. Buttermilk. Soy milk.  Seasoning and other foods  Any sugar-free gum or candy. Foods that contain artificial sweeteners such as sorbitol, mannitol, isomalt, or xylitol. Foods that contain honey, high-fructose corn syrup, or agave. Bouillon, vegetable stock, beef stock, and chicken stock. Garlic and onion powder. Condiments made with onion, such as hummus, chutney, pickles, relish, salad dressing, and salsa. Tomato paste.  Beverages  Chicory-based drinks. Coffee substitutes. Chamomile tea. Fennel tea. Sweet or fortified blu such as port or padilla. Diet soft drinks made with isomalt, mannitol, maltitol, sorbitol, or xylitol. Apple, pear, and macey juice. Juices with high-fructose corn syrup.  The items listed above may not be a complete list of foods and beverages you should avoid. Contact a dietitian for more information.  Summary  FODMAP stands for fermentable oligosaccharides, disaccharides, monosaccharides, and polyols. These are sugars that are hard for some people to  digest.  A low-FODMAP eating plan is a short-term diet that helps to ease symptoms of certain bowel diseases.  The eating plan usually lasts up to 6 weeks. After that, high-FODMAP foods are reintroduced gradually and one at a time. This can help you find out which foods may be causing symptoms.  A low-FODMAP eating plan can be complicated. It is best to work with a dietitian who has experience with this type of plan.  This information is not intended to replace advice given to you by your health care provider. Make sure you discuss any questions you have with your health care provider.  Document Revised: 05/06/2021 Document Reviewed: 05/06/2021  Elsevier Patient Education © 2021 [employer]

## 2022-07-14 NOTE — PROGRESS NOTES
Chief Complaint  Irritable Bowel Syndrome    Raciel Mancini is a 52 y.o. male who presents to [facility]STR[facility]OLOG[facility] for follow-up of IBS-D and fecal urgency.    History of present Illness    At last visit patient was prescribed cholestyramine powder.  He reports that cholestyramine powder is working for diarrhea.  He is taking once daily.  States that if he takes two, will experience constipation.        Past Medical History:   Diagnosis Date   • Arthritis    • Diarrhea    • GERD (gastroesophageal reflux disease)    • High cholesterol    • Hyperlipemia    • Hyperlipidemia    • Irritable bowel syndrome    • Nerve damage     bilateral wrists   • Rectal bleeding        Past Surgical History:   Procedure Laterality Date   • COLONOSCOPY     • COLONOSCOPY N/A 9/23/2021    Procedure: COLONOSCOPY;  Surgeon: Hanane Tapia MD;  Location: [facility] ENDOSCOPY;  Service: Gastroenterology;  Laterality: N/A;  NORMAL COLONOSCOPY   • HEMORRHOIDECTOMY           Current Outpatient Medications:   •  cholestyramine light 4 g powder, Take 1 packet by mouth Daily. mixed with a liquid, Disp: 378 g, Rfl: 3  •  dicyclomine (BENTYL) 20 MG tablet, Take 1 tablet by mouth Every 6 (Six) Hours As Needed (abdominal pain and diarrhea)., Disp: 120 tablet, Rfl: 1  •  Omega-3 Fatty Acids (fish oil) 1000 MG capsule capsule, Take 1,000 mg by mouth Daily With Breakfast., Disp: , Rfl:   •  simvastatin (ZOCOR) 20 MG tablet, Take 20 mg by mouth Every Night., Disp: , Rfl:      No Known Allergies    Family History   Problem Relation Age of Onset   • Diabetes Mother    • Other Mother         Bladder stones   • Arthritis Mother    • Prostate cancer Father    • Arthritis Father    • Cancer Brother    • Diabetes Brother    • Arthritis Brother    • Prostate cancer Paternal Grandfather         Social History     Social History Narrative   • Not on file       Objective       Vital Signs:   /75 (BP Location: Left arm, Patient  "Position: Sitting, Cuff Size: Adult)   Pulse 68   Ht 180.3 cm (71\")   Wt 92.1 kg (203 lb)   BMI 28.31 kg/m²       Physical Exam  Constitutional:       General: He is not in acute distress.     Appearance: Normal appearance. He is well-developed and normal weight.   HENT:      Head: Normocephalic and atraumatic.   Eyes:      Conjunctiva/sclera: Conjunctivae normal.      Pupils: Pupils are equal, round, and reactive to light.      Visual Fields: Right eye visual fields normal and left eye visual fields normal.   Cardiovascular:      Rate and Rhythm: Normal rate and regular rhythm.      Heart sounds: Normal heart sounds.   Pulmonary:      Effort: Pulmonary effort is normal. No retractions.      Breath sounds: Normal breath sounds and air entry.   Abdominal:      General: Bowel sounds are normal.      Palpations: Abdomen is soft.      Tenderness: There is no abdominal tenderness.      Comments: No appreciable hepatosplenomegaly or ascites   Musculoskeletal:         General: Normal range of motion.      Cervical back: Neck supple.      Right lower leg: No edema.      Left lower leg: No edema.   Lymphadenopathy:      Cervical: No cervical adenopathy.   Skin:     General: Skin is warm and dry.      Findings: No lesion.   Neurological:      General: No focal deficit present.      Mental Status: He is alert and oriented to person, place, and time.   Psychiatric:         Mood and Affect: Mood and affect normal.         Behavior: Behavior normal.         Result Review :     The following data was reviewed by: DINO Christie on 07/14/2022:    2/24/2022 celiac panel-negative.                        Assessment and Plan    Diagnoses and all orders for this visit:    1. Irritable bowel syndrome with diarrhea (Primary)  -     cholestyramine light 4 g powder; Take 1 packet by mouth Daily. mixed with a liquid  Dispense: 378 g; Refill: 3              Follow Up   Return in about 9 months (around 4/14/2023) for " IBS.  Patient was given instructions and counseling regarding his condition or for health maintenance advice. Please see specific information pulled into the AVS if appropriate.

## 2023-04-18 ENCOUNTER — OFFICE VISIT (OUTPATIENT)
Dept: GASTROENTEROLOGY | Facility: CLINIC | Age: 53
End: 2023-04-18
Payer: OTHER GOVERNMENT

## 2023-04-18 VITALS
WEIGHT: 206 LBS | DIASTOLIC BLOOD PRESSURE: 72 MMHG | HEART RATE: 74 BPM | SYSTOLIC BLOOD PRESSURE: 106 MMHG | BODY MASS INDEX: 28.84 KG/M2 | HEIGHT: 71 IN

## 2023-04-18 DIAGNOSIS — K58.0 IRRITABLE BOWEL SYNDROME WITH DIARRHEA: Primary | ICD-10-CM

## 2023-04-18 RX ORDER — MONTELUKAST SODIUM 4 MG/1
2 TABLET, CHEWABLE ORAL 2 TIMES DAILY
Qty: 120 TABLET | Refills: 2 | Status: SHIPPED | OUTPATIENT
Start: 2023-04-18

## 2023-04-18 RX ORDER — SIMVASTATIN 10 MG
TABLET ORAL
COMMUNITY
Start: 2023-01-06

## 2023-04-18 NOTE — PROGRESS NOTES
Chief Complaint     Irritable Bowel Syndrome    History of Present Illness     Raciel Manciin is a 52 y.o. male who presents to [facility]STR[facility] for follow-up of IBS-D.    He reports that if he takes cholestyramine powder daily, will experience stool that is more firm.  He will often take for a few days and then skip a few days.  If he misses too many days, will experience diarrhea.         History      Past Medical History:   Diagnosis Date   • Arthritis    • Diarrhea    • GERD (gastroesophageal reflux disease)    • High cholesterol    • Hyperlipemia    • Hyperlipidemia    • Irritable bowel syndrome    • Nerve damage     bilateral wrists   • Rectal bleeding      Past Surgical History:   Procedure Laterality Date   • COLONOSCOPY     • COLONOSCOPY N/A 9/23/2021    Procedure: COLONOSCOPY;  Surgeon: Hanane Tapia MD;  Location: [facility] ENDOSCOPY;  Service: Gastroenterology;  Laterality: N/A;  NORMAL COLONOSCOPY   • HEMORRHOIDECTOMY       Family History   Problem Relation Age of Onset   • Diabetes Mother    • Other Mother         Bladder stones   • Arthritis Mother    • Prostate cancer Father    • Arthritis Father    • Cancer Brother    • Diabetes Brother    • Arthritis Brother    • Prostate cancer Paternal Grandfather         Current Medications       Current Outpatient Medications:   •  cholestyramine light 4 g powder, Take 1 packet by mouth Daily. mixed with a liquid, Disp: 378 g, Rfl: 3  •  Omega-3 Fatty Acids (fish oil) 1000 MG capsule capsule, Take 1 capsule by mouth Daily With Breakfast., Disp: , Rfl:   •  simvastatin (ZOCOR) 10 MG tablet, , Disp: , Rfl:   •  colestipol (COLESTID) 1 g tablet, Take 2 tablets by mouth 2 (Two) Times a Day., Disp: 120 tablet, Rfl: 2     Allergies     No Known Allergies    Social History       Social History     Social History Narrative   • Not on file         Objective       /72 (BP Location: Left arm, Patient Position: Sitting, Cuff  "Size: Adult)   Pulse 74   Ht 180.3 cm (71\")   Wt 93.4 kg (206 lb)   BMI 28.73 kg/m²       Physical Exam    Results       Result Review :                     Assessment and Plan              Diagnoses and all orders for this visit:    1. Irritable bowel syndrome with diarrhea (Primary)  -     colestipol (COLESTID) 1 g tablet; Take 2 tablets by mouth 2 (Two) Times a Day.  Dispense: 120 tablet; Refill: 2      Discussed titrating dose with tablet form of cholestyramine.      Follow Up     Follow Up   Return in about 9 months (around 1/18/2024) for IBS.  Patient was given instructions and counseling regarding his condition or for health maintenance advice. Please see specific information pulled into the AVS if appropriate.     "

## 2023-04-19 ENCOUNTER — TELEPHONE (OUTPATIENT)
Dept: GASTROENTEROLOGY | Facility: CLINIC | Age: 53
End: 2023-04-19
Payer: OTHER GOVERNMENT

## 2023-04-19 NOTE — TELEPHONE ENCOUNTER
"[facility] Colest[facility], per document it states \" Patient is NOT approved for care from this provider. We are unable to fill prescriptions from this provider. Please fax orders to [facility] primary care provider for review\" I have indexed this into the chart under media. Please advise   "

## 2023-09-27 ENCOUNTER — OFFICE VISIT (OUTPATIENT)
Dept: INTERNAL MEDICINE | Facility: CLINIC | Age: 53
End: 2023-09-27
Payer: OTHER GOVERNMENT

## 2023-09-27 VITALS
TEMPERATURE: 97.3 F | BODY MASS INDEX: 30.04 KG/M2 | DIASTOLIC BLOOD PRESSURE: 82 MMHG | HEIGHT: 71 IN | WEIGHT: 214.6 LBS | SYSTOLIC BLOOD PRESSURE: 110 MMHG | HEART RATE: 65 BPM | OXYGEN SATURATION: 97 %

## 2023-09-27 DIAGNOSIS — E78.2 MIXED HYPERLIPIDEMIA: Primary | ICD-10-CM

## 2023-09-27 LAB
CHOLEST SERPL-MCNC: 219 MG/DL
HDLC SERPL-MCNC: 44 MG/DL (ref 40–60)
LDLC SERPL CALC-MCNC: 137.4 MG/DL
TRIGL SERPL-MCNC: 188 MG/DL (ref 0–150)

## 2023-09-27 PROCEDURE — 99204 OFFICE O/P NEW MOD 45 MIN: CPT | Performed by: INTERNAL MEDICINE

## 2023-09-27 RX ORDER — CHOLESTYRAMINE 4 G/9G
POWDER, FOR SUSPENSION ORAL EVERY 24 HOURS
COMMUNITY

## 2023-09-27 NOTE — PROGRESS NOTES
"Chief Complaint  Back Pain (Has been treated, is now doing physical therapy. /Pt has paper work to be filled out, going through adoption with grandson.)    Subjective       Raciel Mancini presents to [facility]    HPI   Presenting to [facility]. Previously seeing [facility] but provider left.     Had back pain flare recently that [facility]. Now doing PT which is helping.    In school, studying .     Runs a local nursery.    HLD: on statin, tolerating well, denies muscle pain/weakness. Well controlled on last labs.    Dx with IBS with diarrhea. Taking colestipol for this as needed. Followed by RAE Garsia.     Is in the process of adopting his grandson. He has been living with him and his wife for over 1 year already. Needs paperwork completed.         Objective     Vitals:    09/27/23 1040   BP: 110/82   BP Location: Left arm   Patient Position: Sitting   Cuff Size: Adult   Pulse: 65   Temp: 97.3 °F (36.3 °C)   TempSrc: Temporal   SpO2: 97%   Weight: 97.3 kg (214 lb 9.6 oz)   Height: 180.3 cm (70.98\")      Wt Readings from Last 3 Encounters:   09/27/23 97.3 kg (214 lb 9.6 oz)   04/18/23 93.4 kg (206 lb)   07/14/22 92.1 kg (203 lb)      BP Readings from Last 3 Encounters:   09/27/23 110/82   04/18/23 106/72   07/14/22 117/75        Body mass index is 29.94 kg/m².    BMI is >= 25 and <30. (Overweight) The following options were offered after discussion;: exercise counseling/recommendations and nutrition counseling/recommendations       Physical Exam  Vitals reviewed.   Constitutional:       Appearance: Normal appearance. He is well-developed.   HENT:      Head: Normocephalic and atraumatic.      Mouth/Throat:      Pharynx: No oropharyngeal exudate.   Eyes:      Conjunctiva/sclera: Conjunctivae normal.      Pupils: Pupils are equal, round, and reactive to light.   Neck:      Thyroid: No thyromegaly or thyroid " tenderness.   Cardiovascular:      Rate and Rhythm: Normal rate and regular rhythm.      Heart sounds: No murmur heard.    No friction rub. No gallop.   Pulmonary:      Effort: Pulmonary effort is normal.      Breath sounds: Normal breath sounds. No wheezing or rhonchi.   Lymphadenopathy:      Cervical: No cervical adenopathy.   Skin:     General: Skin is warm and dry.   Neurological:      Mental Status: He is alert and oriented to person, place, and time.   Psychiatric:         Mood and Affect: Affect normal.        Result Review :   The following data was reviewed by: Kalie Arias MD on 09/27/2023:  Lipid Panel          6/4/2023    00:00   Lipid Panel   Total Cholesterol 219       Triglycerides 188       HDL Cholesterol 44          Details          This result is from an external source.                 Procedures    Assessment and Plan   Diagnoses and all orders for this visit:    1. Mixed hyperlipidemia (Primary)  Assessment & Plan:  [facility]  On statin, tolerating well  Last lab results reviewed          I spent 45 minutes caring for Raciel on this date of service. This time includes time spent by me in the following activities:preparing for the visit, reviewing tests, obtaining and/or reviewing a separately obtained history, performing a medically appropriate examination and/or evaluation , counseling and educating the patient/family/caregiver, and documenting information in the medical record    Follow Up   Return in about 6 months (around 3/27/2024) for Next scheduled follow up, or sooner if needed.  Patient was given instructions and counseling regarding his condition or for health maintenance advice. Please see specific information pulled into the AVS if appropriate.

## 2024-01-18 ENCOUNTER — OFFICE VISIT (OUTPATIENT)
Dept: GASTROENTEROLOGY | Facility: CLINIC | Age: 54
End: 2024-01-18
Payer: OTHER GOVERNMENT

## 2024-01-18 VITALS
HEIGHT: 71 IN | HEART RATE: 68 BPM | WEIGHT: 218 LBS | BODY MASS INDEX: 30.52 KG/M2 | SYSTOLIC BLOOD PRESSURE: 121 MMHG | DIASTOLIC BLOOD PRESSURE: 80 MMHG

## 2024-01-18 DIAGNOSIS — K58.0 IRRITABLE BOWEL SYNDROME WITH DIARRHEA: Primary | ICD-10-CM

## 2024-01-18 RX ORDER — MONTELUKAST SODIUM 4 MG/1
2 TABLET, CHEWABLE ORAL 2 TIMES DAILY
Qty: 120 TABLET | Refills: 5 | Status: SHIPPED | OUTPATIENT
Start: 2024-01-18

## 2024-01-18 NOTE — PROGRESS NOTES
"Chief Complaint     Irritable Bowel Syndrome    History of Present Illness     Raciel Mancini is a 53 y.o. male who presents to [facility]STRO[facility] for follow-up of IBS-D.      He reports that he's taking 2 grams of colestid daily.  Skips doses if stool becomes too firm.  Then will experience loose stools and fecal urgency.       History      Past Medical History:   Diagnosis Date    Arthritis     Diarrhea     GERD (gastroesophageal reflux disease)     High cholesterol     Hyperlipemia     Hyperlipidemia     Irritable bowel syndrome     Nerve damage     bilateral wrists    Rectal bleeding      Past Surgical History:   Procedure Laterality Date    COLONOSCOPY      COLONOSCOPY N/A 9/23/2021    Procedure: COLONOSCOPY;  Surgeon: Hanane Tapia MD;  Location: [facility] ENDOSCOPY;  Service: Gastroenterology;  Laterality: N/A;  NORMAL COLONOSCOPY    HEMORRHOIDECTOMY       Family History   Problem Relation Age of Onset    Diabetes Mother     Other Mother         Bladder stones    Arthritis Mother     Prostate cancer Father     Arthritis Father     Cancer Brother     Diabetes Brother     Arthritis Brother     Prostate cancer Paternal Grandfather         Current Medications       Current Outpatient Medications:     colestipol (COLESTID) 1 g tablet, Take 2 tablets by mouth 2 (Two) Times a Day., Disp: 120 tablet, Rfl: 5    Omega-3 Fatty Acids (fish oil) 1000 MG capsule capsule, Take 1 capsule by mouth Daily With Breakfast., Disp: , Rfl:     simvastatin (ZOCOR) 10 MG tablet, , Disp: , Rfl:      Allergies     Allergies   Allergen Reactions    Soap Hives       Social History       Social History     Social History Narrative    Not on file         Objective       /80 (BP Location: Left arm, Patient Position: Sitting, Cuff Size: Adult)   Pulse 68   Ht 180.3 cm (71\")   Wt 98.9 kg (218 lb)   BMI 30.40 kg/m²       Physical Exam    Results       Result Review :                     Assessment " and Plan              Diagnoses and all orders for this visit:    1. Irritable bowel syndrome with diarrhea (Primary)  -     colestipol (COLESTID) 1 g tablet; Take 2 tablets by mouth 2 (Two) Times a Day.  Dispense: 120 tablet; Refill: 5    Discussing trying 1 gram colestid daily or every other day dosing.        Follow Up     Follow Up   Return in about 1 year (around 1/18/2025) for IBS.  Patient was given instructions and counseling regarding his condition or for health maintenance advice. Please see specific information pulled into the AVS if appropriate.

## 2024-03-28 ENCOUNTER — OFFICE VISIT (OUTPATIENT)
Dept: INTERNAL MEDICINE | Facility: CLINIC | Age: 54
End: 2024-03-28
Payer: OTHER GOVERNMENT

## 2024-03-28 VITALS
TEMPERATURE: 97.8 F | DIASTOLIC BLOOD PRESSURE: 78 MMHG | HEIGHT: 71 IN | HEART RATE: 80 BPM | OXYGEN SATURATION: 96 % | RESPIRATION RATE: 20 BRPM | BODY MASS INDEX: 30.12 KG/M2 | SYSTOLIC BLOOD PRESSURE: 124 MMHG | WEIGHT: 215.13 LBS

## 2024-03-28 DIAGNOSIS — E78.2 MIXED HYPERLIPIDEMIA: Primary | ICD-10-CM

## 2024-03-28 LAB
CHOLEST SERPL-MCNC: 244 MG/DL
HDLC SERPL-MCNC: 46 MG/DL (ref 40–60)
LDLC SERPL DIRECT ASSAY-MCNC: 145.6 MG/DL
TRIGL SERPL-MCNC: 262 MG/DL (ref 0–150)

## 2024-03-28 PROCEDURE — 99213 OFFICE O/P EST LOW 20 MIN: CPT | Performed by: INTERNAL MEDICINE

## 2024-03-28 RX ORDER — ROSUVASTATIN CALCIUM 10 MG/1
10 TABLET, COATED ORAL DAILY
COMMUNITY

## 2024-03-28 RX ORDER — ERGOCALCIFEROL 1.25 MG/1
50000 CAPSULE ORAL WEEKLY
COMMUNITY

## 2024-03-28 NOTE — PROGRESS NOTES
"Chief Complaint  Follow-up (6 month follow up hyperlipidemia, sinvastatin was changed to crestor but unsure of the dosage )    Subjective      Raciel Mancini is a 53 y.o. male who presents to [facility] for follow up.     [facility]     HLD: on statin, tolerating well.         Objective   Vital Signs:   Vitals:    03/28/24 0953   BP: 124/78   BP Location: Left arm   Patient Position: Sitting   Cuff Size: Adult   Pulse: 80   Resp: 20   Temp: 97.8 °F (36.6 °C)   TempSrc: Temporal   SpO2: 96%   Weight: 97.6 kg (215 lb 2 oz)   Height: 180.3 cm (71\")     Body mass index is 30 kg/m².    Wt Readings from Last 3 Encounters:   03/28/24 97.6 kg (215 lb 2 oz)   01/18/24 98.9 kg (218 lb)   09/27/23 97.3 kg (214 lb 9.6 oz)     BP Readings from Last 3 Encounters:   03/28/24 124/78   01/18/24 121/80   09/27/23 110/82       Health Maintenance   Topic Date Due    HEPATITIS C SCREENING  Never done    ANNUAL PHYSICAL  Never done    COVID-19 Vaccine (2 - 2023-24 season) 09/01/2023    INFLUENZA VACCINE  03/31/2024 (Originally 8/1/2023)    BMI FOLLOWUP  09/27/2024    LIPID PANEL  01/18/2025    COLORECTAL CANCER SCREENING  09/23/2031    TDAP/TD VACCINES (3 - Td or Tdap) 07/19/2033    ZOSTER VACCINE  Completed    Pneumococcal Vaccine 0-64  Aged Out       Physical Exam  Vitals reviewed.   Constitutional:       Appearance: Normal appearance. He is well-developed.   HENT:      Head: Normocephalic and atraumatic.      Mouth/Throat:      Pharynx: No oropharyngeal exudate.   Eyes:      Conjunctiva/sclera: Conjunctivae normal.      Pupils: Pupils are equal, round, and reactive to light.   Neck:      Thyroid: No thyromegaly or thyroid tenderness.   Cardiovascular:      Rate and Rhythm: Normal rate and regular rhythm.      Heart sounds: No murmur heard.     No friction rub. No gallop.   Pulmonary:      Effort: Pulmonary effort is normal.      Breath sounds: Normal " breath sounds. No wheezing or rhonchi.   Lymphadenopathy:      Cervical: No cervical adenopathy.   Skin:     General: Skin is warm and dry.   Neurological:      Mental Status: He is alert and oriented to person, place, and time.   Psychiatric:         Mood and Affect: Affect normal.          Result Review :  The following data was reviewed by: Kalie Arias MD on 03/28/2024:         Procedures          Assessment & Plan  Mixed hyperlipidemia   On statin, tolerating well. Continue current [facility] by [facility].   Orders Placed This Encounter   Procedures    Lipid Panel                       FOLLOW UP  Return in about 6 months (around 9/28/2024) for Next scheduled follow up.  Patient was given instructions and counseling regarding his condition or for health maintenance advice. Please see specific information pulled into the AVS if appropriate.       Kalie Arias MD  03/28/24  10:20 EDT    CURRENT & DISCONTINUED MEDICATIONS  Current Outpatient Medications   Medication Instructions    colestipol (COLESTID) 2 g, Oral, 2 Times Daily    fish oil 1,000 mg, Oral, Daily With Breakfast    rosuvastatin (CRESTOR) 10 mg, Oral, Daily, Unsure of exact dosage     vitamin D (ERGOCALCIFEROL) 50,000 Units, Oral, Weekly       Medications Discontinued During This Encounter   Medication Reason    simvastatin (ZOCOR) 10 MG tablet Alternate therapy

## 2024-10-03 ENCOUNTER — OFFICE VISIT (OUTPATIENT)
Dept: INTERNAL MEDICINE | Facility: CLINIC | Age: 54
End: 2024-10-03
Payer: OTHER GOVERNMENT

## 2024-10-03 VITALS
BODY MASS INDEX: 30.8 KG/M2 | HEART RATE: 67 BPM | HEIGHT: 71 IN | RESPIRATION RATE: 16 BRPM | TEMPERATURE: 97.6 F | SYSTOLIC BLOOD PRESSURE: 126 MMHG | WEIGHT: 220 LBS | DIASTOLIC BLOOD PRESSURE: 76 MMHG | OXYGEN SATURATION: 98 %

## 2024-10-03 DIAGNOSIS — G89.29 CHRONIC PAIN OF LEFT KNEE: ICD-10-CM

## 2024-10-03 DIAGNOSIS — M25.562 CHRONIC PAIN OF LEFT KNEE: ICD-10-CM

## 2024-10-03 DIAGNOSIS — E78.2 MIXED HYPERLIPIDEMIA: Primary | ICD-10-CM

## 2024-10-03 LAB
CHOLEST SERPL-MCNC: 204 MG/DL
HBA1C MFR BLD: 5.2 %
HDLC SERPL-MCNC: 45 MG/DL (ref 40–60)
LDLC SERPL DIRECT ASSAY-MCNC: 107.2 MG/DL
TRIGL SERPL-MCNC: 259 MG/DL (ref 0–150)

## 2024-10-03 PROCEDURE — 99213 OFFICE O/P EST LOW 20 MIN: CPT | Performed by: INTERNAL MEDICINE

## 2024-10-03 NOTE — PROGRESS NOTES
"Chief Complaint  Follow-up (6 month follow up, left knee has been hurting him again when going up stairs and feels weak ) and Hyperlipidemia    Subjective      Raciel Mancini is a 54 y.o. male who presents to [facility]     Presenting for 6-month follow-up.    [facility].  Is doing well on Crestor for hyperlipidemia.  [facility] from [facility] reviewed.    He states that his left knee has been hurting him when he goes upstairs.  He is able to walk and go downstairs normally without pain.  He has had this complaint before but it had gotten better as his frequency of going up and down stairs had been last.  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    Objective   Vital Signs:   Vitals:    10/03/24 0957   BP: 126/76   BP Location: Right arm   Patient Position: Sitting   Cuff Size: Adult   Pulse: 67   Resp: 16   Temp: 97.6 °F (36.4 °C)   TempSrc: Temporal   SpO2: 98%   Weight: 99.8 kg (220 lb)   Height: 180.3 cm (71\")     Body mass index is 30.68 kg/m².    Wt Readings from Last 3 Encounters:   10/03/24 99.8 kg (220 lb)   03/28/24 97.6 kg (215 lb 2 oz)   01/18/24 98.9 kg (218 lb)     BP Readings from Last 3 Encounters:   10/03/24 126/76   03/28/24 124/78   01/18/24 121/80       Health Maintenance   Topic Date Due    HEPATITIS C SCREENING  Never done    ANNUAL PHYSICAL  Never done    INFLUENZA VACCINE  03/31/2025 (Originally 8/1/2024)    COVID-19 Vaccine (2 - 2023-24 season) 10/03/2025 (Originally 9/1/2024)    LIPID PANEL  07/19/2025    BMI FOLLOWUP  10/03/2025    COLORECTAL CANCER SCREENING  09/23/2031    TDAP/TD VACCINES (3 - Td or Tdap) 07/19/2033    ZOSTER VACCINE  Completed    Pneumococcal Vaccine 0-64  Aged Out       Physical Exam  Vitals reviewed.   Constitutional:       Appearance: Normal appearance. He is well-developed.   HENT:      Head: Normocephalic and atraumatic.      Mouth/Throat:      " Pharynx: No oropharyngeal exudate.   Eyes:      Conjunctiva/sclera: Conjunctivae normal.      Pupils: Pupils are equal, round, and reactive to light.   Neck:      Thyroid: No thyromegaly or thyroid tenderness.   Cardiovascular:      Rate and Rhythm: Normal rate and regular rhythm.      Heart sounds: No murmur heard.     No friction rub. No gallop.   Pulmonary:      Effort: Pulmonary effort is normal.      Breath sounds: Normal breath sounds. No wheezing or rhonchi.   Lymphadenopathy:      Cervical: No cervical adenopathy.   Skin:     General: Skin is warm and dry.   Neurological:      Mental Status: He is alert and oriented to person, place, and time.   Psychiatric:         Mood and Affect: Affect normal.          Result Review :  The following data was reviewed by: Kalie Arias MD on 10/03/2024:      Lipid Panel          1/18/2024    00:00 7/19/2024    00:00   Lipid Panel   Total Cholesterol 244     204       Triglycerides 262     259       HDL Cholesterol 46     45       LDL Cholesterol  145.6     107.2          Details          This result is from an external source.             Most Recent A1C          7/19/2024    00:00   HGBA1C Most Recent   Hemoglobin A1C 5.2          Details          This result is from an external source.                  Procedures          Assessment & Plan  Mixed hyperlipidemia  Well-controlled on Crestor.  [facility].  Chronic pain of left knee  States that he will talk to his [facility] doctor about this complaint to see if he can get some physical therapy through the [facility].  If he is not able to get services that way, he will contact the clinic for referrals.  Orders Placed This Encounter   Procedures    Lipid Panel    Hemoglobin A1c              FOLLOW UP  Return in about 1 year (around 10/3/2025) for Next scheduled follow up.  Patient was given instructions and counseling regarding his condition or for health maintenance advice. Please see specific information pulled into the  AVS if appropriate.       Kalie Arias MD  10/03/24  10:47 EDT    CURRENT & DISCONTINUED MEDICATIONS  Current Outpatient Medications   Medication Instructions    colestipol (COLESTID) 2 g, Oral, 2 Times Daily    fish oil 1,000 mg, Oral, Daily With Breakfast    rosuvastatin (CRESTOR) 10 mg, Oral, Daily    vitamin D (ERGOCALCIFEROL) 50,000 Units, Oral, Daily       There are no discontinued medications.

## 2025-01-21 ENCOUNTER — OFFICE VISIT (OUTPATIENT)
Dept: GASTROENTEROLOGY | Facility: CLINIC | Age: 55
End: 2025-01-21
Payer: OTHER GOVERNMENT

## 2025-01-21 VITALS
HEIGHT: 71 IN | DIASTOLIC BLOOD PRESSURE: 79 MMHG | SYSTOLIC BLOOD PRESSURE: 127 MMHG | BODY MASS INDEX: 31.05 KG/M2 | HEART RATE: 75 BPM | WEIGHT: 221.8 LBS

## 2025-01-21 DIAGNOSIS — K58.0 IRRITABLE BOWEL SYNDROME WITH DIARRHEA: Primary | ICD-10-CM

## 2025-01-21 PROBLEM — K58.9 IRRITABLE BOWEL SYNDROME: Status: ACTIVE | Noted: 2023-04-18

## 2025-01-21 PROCEDURE — 99213 OFFICE O/P EST LOW 20 MIN: CPT | Performed by: NURSE PRACTITIONER

## 2025-01-21 RX ORDER — COLESTIPOL HYDROCHLORIDE 1 G/1
2 TABLET ORAL 2 TIMES DAILY
Qty: 120 TABLET | Refills: 5 | Status: SHIPPED | OUTPATIENT
Start: 2025-01-21

## 2025-01-21 NOTE — PROGRESS NOTES
Chief Complaint     Irritable Bowel Syndrome    History of Present Illness     Raciel Mancini is a 54 y.o. male who presents to [facility]STR[facility]OLOG[facility] for follow-up of IBS-D.      He is continuing to take colestid 2 grams daily. He is having a bowel movement 2-3 times per day.  Describes stool as a #5-6 on the bristol stool chart.  Denies rectal bleeding.    If he doesn't take colestid, will have a bowel movement with each meal.      Occasionally has fecal urgency following meals.       History      Past Medical History:   Diagnosis Date    Arthritis     Diarrhea     GERD (gastroesophageal reflux disease)     High cholesterol     Hyperlipemia     Hyperlipidemia     Irritable bowel syndrome     Nerve damage     bilateral wrists    Rectal bleeding      Past Surgical History:   Procedure Laterality Date    COLONOSCOPY      COLONOSCOPY N/A 9/23/2021    Procedure: COLONOSCOPY;  Surgeon: Hanane Tapia MD;  Location: [facility] ENDOSCOPY;  Service: Gastroenterology;  Laterality: N/A;  NORMAL COLONOSCOPY    HEMORRHOIDECTOMY       Family History   Problem Relation Age of Onset    Diabetes Mother     Other Mother         Bladder stones    Arthritis Mother     Prostate cancer Father     Arthritis Father     Cancer Father     Cancer Brother     Diabetes Brother     Arthritis Brother     Prostate cancer Paternal Grandfather     Diabetes Brother         Current Medications       Current Outpatient Medications:     colestipol (COLESTID) 1 g tablet, Take 2 tablets by mouth 2 (Two) Times a Day., Disp: 120 tablet, Rfl: 5    Omega-3 Fatty Acids (fish oil) 1000 MG capsule capsule, Take 1 capsule by mouth Daily With Breakfast., Disp: , Rfl:     rosuvastatin (CRESTOR) 10 MG tablet, Take 1 tablet by mouth Daily., Disp: , Rfl:     vitamin D (ERGOCALCIFEROL) 1.25 MG (44858 UT) capsule capsule, Take 1 capsule by mouth Daily., Disp: , Rfl:      Allergies     Allergies   Allergen Reactions    Soap Hives      "Laundry detergent        Social History       Social History     Social History Narrative    Not on file         Objective       /79 (BP Location: Left arm, Patient Position: Sitting, Cuff Size: Adult)   Pulse 75   Ht 180.3 cm (70.98\")   Wt 101 kg (221 lb 12.8 oz)   BMI 30.95 kg/m²       Physical Exam    Results       Result Review :                     Assessment and Plan              Diagnoses and all orders for this visit:    1. Irritable bowel syndrome with diarrhea (Primary)  -     colestipol (COLESTID) 1 g tablet; Take 2 tablets by mouth 2 (Two) Times a Day.  Dispense: 120 tablet; Refill: 5          Follow Up     Follow Up   Return in about 1 year (around 1/21/2026) for IBS.  Patient was given instructions and counseling regarding his condition or for health maintenance advice. Please see specific information pulled into the AVS if appropriate.     "

## 2025-01-31 ENCOUNTER — TREATMENT (OUTPATIENT)
Dept: PHYSICAL THERAPY | Facility: CLINIC | Age: 55
End: 2025-01-31
Payer: OTHER GOVERNMENT

## 2025-01-31 DIAGNOSIS — M54.16 RADICULOPATHY, LUMBAR REGION: Primary | ICD-10-CM

## 2025-01-31 DIAGNOSIS — R29.898 IMPAIRED FLEXIBILITY OF LOWER EXTREMITY: ICD-10-CM

## 2025-01-31 DIAGNOSIS — R29.898 WEAKNESS OF BOTH LOWER EXTREMITIES: ICD-10-CM

## 2025-01-31 NOTE — PROGRESS NOTES
Physical Therapy Initial Evaluation and Plan of Care  [address] [facility] [address] [place] [address]    Patient: Raciel Mancini   : 1970  Diagnosis/ICD-10 Code:  Radiculopathy, lumbar region [M54.16]  Referring practitioner: Akil Patino MD  Date of Initial Visit: 2025  Today's Date: 2025  Patient seen for 1 sessions           Subjective Questionnaire: Oswestry:       Subjective Evaluation    History of Present Illness  Mechanism of injury: Pt presents to PT with reports of numbness and tingling down L LE. Pt reports the pain will shoot down the back of his L LE to his feet. Pt reports he is starting to have the same pain down his R LE, but this pain stops at mid thigh. Pt reports a chronic history of LBP and numbness and tingling down LE's. Pt reports he will have an increase in pain with prolonged standing and performing a STS after prolonged sitting. Pt has had PT prior for pain. Pt reports years ago when he had PT he received: ice, heat, traction, and e-stim. Pt has had a MRI of lumbar spine revealing:    Disc degeneration at L4/L5 with mild posterior disc space narrowing. More significant narrowing at L5/S1. Spondylolisthesis at L5/S1 (grade 2) and chronic L5 spondylosis. L3/L4 and T12/L1 small disc bulge. L5/S1 severe stenosis on both R and L side.      Patient Occupation: Administrative Pain  Current pain rating: 3  At best pain ratin  At worst pain ratin  Quality: needle-like  Aggravating factors: ambulation, prolonged positioning and standing    Treatments  Previous treatment: physical therapy  Patient Goals  Patient goals for therapy: decreased pain             Objective          Palpation   Left   Tenderness of the lumbar paraspinals.     Right Tenderness of the lumbar paraspinals.     Tenderness     Additional Tenderness Details  Increase in pain with PA's to L4-S1 with hypomobility     Neurological Testing     Sensation     Lumbar   Left   Intact: light touch    Right    Intact: light touch    Active Range of Motion     Additional Active Range of Motion Details  Lumbar flexion: limited by 25%  Lumbar extension: 0 degrees  SB to R: R knee  SB to L: Mid thigh  Rotation to R: Limited by 50%  Rotation to the L: Limited by 50%  No hip ER bilaterally     Strength/Myotome Testing     Left Hip   Planes of Motion   Flexion: 4+  Extension: 4-  Abduction: 4+  Adduction: 4+    Right Hip   Planes of Motion   Flexion: 4+  Extension: 4-  Abduction: 4+  Adduction: 4+    Left Knee   Flexion: 5  Extension: 5    Right Knee   Flexion: 5  Extension: 5    Tests     Lumbar     Left   Negative crossed SLR, passive SLR and quadrant.     Right   Negative crossed SLR, passive SLR and quadrant.     Additional Tests Details  Significant increase in tightness with SKTC - bilaterally     Lumbar Flexibility Comments:   Significant decrease in flexibility in B hamstring and B piriformis         See Exercise, Manual, and Modality Logs for complete treatment.       Assessment & Plan       Assessment  Impairments: abnormal or restricted ROM, activity intolerance, impaired physical strength, lacks appropriate home exercise program, pain with function and weight-bearing intolerance   Functional limitations: lifting, walking, uncomfortable because of pain and standing   Assessment details: Pt presents to PT with reports of numbness and tingling down L LE with some pain down R LE. Pt has signs and symptoms consistent with lumbar radiculopathy. Pt has slight decrease in strength, significant decrease in flexibility, decrease in B hip ROM, increase in pain, and decrease in functional mobility. Pt requires skilled PT in order to address deficits listed to return patient back to maximum function such as prolonged standing with less pain. Educated patient on HEP and patient tolerated well.    Prognosis: good    Goals  Plan Goals: LUMBAR PROBLEMS:    1. The patient has complaints of pain.    LTG 1: 12 weeks: The patient will  report lower back pain no greater than 1/10 in order to improve tolerance with activities of daily living and improve sleep quality.    STATUS: New    STG 1a: 6 weeks: The patient will report lower back pain no greater than 3/10.     STATUS: New      2. The patient has limited lumbar spine ROM.    LTG 2: 12 weeks: The patient will improve lumbar spine ROM to 100% in order to improve tolerance with activities of daily living.    STATUS: New      STG 2a: 6 weeks: The patient will improve lumbar spine ROM to 80% in order to improve tolerance with activities of daily living.    STATUS: New    3. The patient demonstrates weakness of the bilateral hip.    LTG 3: 12 weeks: The patient will demonstrate 5/5 strength for bilateral hip flexion, abduction, and extension in order to improve hip stability.    STATUS: New      STG 3a: 6 weeks: The patient will demonstrate 4+/5 strength for bilateral hip flexion, abduction, and extension.    STATUS: New      Mobility: Walking/Moving Around Functional Limitation    LTG 4: 12 weeks: The patient will demonstrate 1-19% limitation by achieving a score of 8 on the Oswestry Disability Questionnaire.    STATUS: New    STG 4a: 6 weeks: The patient will demonstrate 20-39% limitation by achieving a score of 16 on the Oswestry Disability Quiestionnaire.    STATUS: New      Plan  Therapy options: will be seen for skilled therapy services  Planned modality interventions: cryotherapy, TENS, electrical stimulation/Russian stimulation, dry needling, traction and thermotherapy (hydrocollator packs)  Planned therapy interventions: manual therapy, neuromuscular re-education, ADL retraining, balance/weight-bearing training, flexibility, functional ROM exercises, gait training, home exercise program, joint mobilization, soft tissue mobilization, strengthening, stretching, therapeutic activities, abdominal trunk stabilization, postural training, spinal/joint mobilization and IADL retraining  Frequency:  2x week  Duration in weeks: 12  Treatment plan discussed with: patient        History # of Personal Factors and/or Comorbidities: MODERATE (1-2)  Examination of Body System(s): # of elements: MODERATE (3)  Clinical Presentation: STABLE   Clinical Decision Making: MODERATE      Timed:         Manual Therapy:    0     mins  20033;     Therapeutic Exercise:    10     mins  37282;     Neuromuscular Yuko:    0    mins  81891;    Therapeutic Activity:     0     mins  33519;     Gait Trainin     mins  48749;     Ultrasound:     0     mins  31962;    Ionto                               0    mins   36058  Self pay                         0     mins [email or web address]    Un-Timed:  Electrical Stimulation:    0     mins  23220 ( )  Traction     0     mins 71356  Low Eval     0     Mins  40343  Mod Eval     40     Mins  28573  High Eval                       0     Mins  19898  Self Pay Eval                 0     PTSP1   Re-Eval                           0    mins  46427        Timed Treatment:   10   mins   Total Treatment:     50   mins    PT SIGNATURE: Electronically signed by Vinny Nicolas PT  [place] LICENSE: [id number]    DATE TREATMENT INITIATED: 2025    Initial Certification  Certification Period: 2025 thru 2025  I certify that the therapy services are furnished while this patient is under my care.  The services outlined above are required by this patient, and will be reviewed every 90 days.     PHYSICIAN: Akil Patino MD   NPI: [id number]      DATE:     Please sign and return via fax to [phone] Thank you, [facility].

## 2025-02-07 ENCOUNTER — TREATMENT (OUTPATIENT)
Dept: PHYSICAL THERAPY | Facility: CLINIC | Age: 55
End: 2025-02-07
Payer: OTHER GOVERNMENT

## 2025-02-07 DIAGNOSIS — R29.898 WEAKNESS OF BOTH LOWER EXTREMITIES: ICD-10-CM

## 2025-02-07 DIAGNOSIS — R29.898 IMPAIRED FLEXIBILITY OF LOWER EXTREMITY: ICD-10-CM

## 2025-02-07 DIAGNOSIS — M54.16 RADICULOPATHY, LUMBAR REGION: Primary | ICD-10-CM

## 2025-02-07 NOTE — PROGRESS NOTES
[facility] Out[facility] Physical Therapy                              Physical Therapy Daily Treatment Note    Patient: Raciel Mancini   : 1970  Diagnosis/ICD-10 Code:  Radiculopathy, lumbar region [M54.16]  Referring practitioner: Akil Patino MD  Date of Initial Visit: Type: THERAPY  Noted: 2025  Today's Date: 2025  Patient seen for 2 sessions           Subjective   Raciel Mancini reports: his back feels okay at time of therapy. Pt states that prolonged sitting or standing will start to hurt, but if he is moving then typically he is fine.       Objective     See Exercise, Manual, and Modality Logs for complete treatment.     Assessment/Plan  Today is patients first treatment session following initial eval to address outlined deficits. Pt tolerated exercises and manual well, with no complaints of increased pain or discomfort. Patient felt much relief with LAD with slight abduction. Patient will continue to benefit from skilled physical therapy services to further address pain management, and their deficits in strength order to improve upon their functional mobility for any ADL concerns.      Progress per Plan of Care         Timed:  Manual Therapy:    8     mins  54861;  Therapeutic Exercise:    16     mins  52890;     Neuromuscular Yuko:        mins  08605;    Therapeutic Activity:          mins  64583;     Gait Training:           mins  58050;        Untimed:  Electrical Stimulation:         mins  91815 ( );  Mechanical Traction:         mins  89053;       Timed Treatment:   24   mins   Total Treatment:     24   mins      Electronically signed:     Christina Mccracken PTA  Physical Therapist Assistant  [facility] License #: [id number]

## 2025-02-20 ENCOUNTER — TREATMENT (OUTPATIENT)
Dept: PHYSICAL THERAPY | Facility: CLINIC | Age: 55
End: 2025-02-20
Payer: OTHER GOVERNMENT

## 2025-02-20 DIAGNOSIS — M54.16 RADICULOPATHY, LUMBAR REGION: Primary | ICD-10-CM

## 2025-02-20 DIAGNOSIS — R29.898 WEAKNESS OF BOTH LOWER EXTREMITIES: ICD-10-CM

## 2025-02-20 DIAGNOSIS — R29.898 IMPAIRED FLEXIBILITY OF LOWER EXTREMITY: ICD-10-CM

## 2025-02-20 NOTE — PROGRESS NOTES
"Physical Therapy Daily Treatment Note  [address] [place], [address] [place] [address]        Patient: Raciel Mancini   : 1970  Diagnosis/ICD-10 Code:  Radiculopathy, lumbar region [M54.16]  Referring practitioner: Akil Patino MD  Date of Initial Visit: Type: THERAPY  Noted: 2025  Today's Date: 2025  Patient seen for 3 sessions             Subjective   Raciel Mancini reports that he continues to have the same pain / discomfort in his lower back with intermittent \"Tingling\" in left leg.    Objective     + Tightness Bilateral Hip rotators and Jt mobility  + Tightness Bilateral Hamstring    See Exercise and Manual Logs for complete treatment.       Assessment/Plan    Pt tolerated therapy session well - with progression of therapeutic exercises, CKC-Functional activities, and Manual therapy. He has improved, but continues to have deficits in His Trunk-Core and Bilateral Hip  ROM,  Strength, and Stability; limiting functional mobility and ability to perform ADLs without increased pain and difficulty  at this time.  Pt continues to exhibit significant tightness in lower back and left greater than right hip/knee.  He responds well to manual therapy - reporting decrease in pain and tightness post session.  Continued positive response to Trials of Long Axis Distraction with left LE- as pt reported slight reduction of radicular symptoms with distraction.    Progress per Plan of Care- as tolerated.           Timed:  Manual Therapy:    10     mins  37320;  Therapeutic Exercise:    22     mins  27488;     Neuromuscular Yuko:    0    mins  33973;    Therapeutic Activity:     10     mins  73377;     Gait Trainin     mins  90318;     Ultrasound:     0     mins  46899;    Electrical Stimulation:    0     mins  15293;  Iontophoresis     0     mins  25457    Untimed:  Electrical Stimulation:    0     mins  41602 ( );  Mechanical Traction:    0     mins  60567;   Fluidotherapy     0     mins  " 56561  Hot pack     0     mins  35249  Cold pack     0     mins  83537    Timed Treatment:   42   mins   Total Treatment:     42   mins        Samia Almeida PTA  Physical Therapist Assistant

## 2025-02-27 ENCOUNTER — TREATMENT (OUTPATIENT)
Dept: PHYSICAL THERAPY | Facility: CLINIC | Age: 55
End: 2025-02-27
Payer: OTHER GOVERNMENT

## 2025-02-27 DIAGNOSIS — R29.898 IMPAIRED FLEXIBILITY OF LOWER EXTREMITY: ICD-10-CM

## 2025-02-27 DIAGNOSIS — R29.898 WEAKNESS OF BOTH LOWER EXTREMITIES: ICD-10-CM

## 2025-02-27 DIAGNOSIS — M54.16 RADICULOPATHY, LUMBAR REGION: Primary | ICD-10-CM

## 2025-02-27 NOTE — PROGRESS NOTES
"Physical Therapy Daily Treatment Note  [address], [address] [place] [address]        Patient: Raciel Mancini   : 1970  Diagnosis/ICD-10 Code:  Radiculopathy, lumbar region [M54.16]  Referring practitioner: Akil Patino MD  Date of Initial Visit: Type: THERAPY  Noted: 2025  Today's Date: 2025  Patient seen for 4 sessions             Subjective     Raciel Mancini reports having his normal stiffness.  He reports minimal change since beginning physical Therapy.  He reports that he continues to be very stiff and painful with all movements early in the mornings, but state he feels better as he moves around and \"Loosens up\".  Pt reports that he did more driving and standing this weekend which increased his pain and \"Sciatica\" in his left hip.    Objective     + Tightness Bilateral Hip rotators and Jt mobility  + Tightness Bilateral Hamstring      See Exercise and Manual Logs for complete treatment.       Assessment/Plan    Pt tolerated therapy session well - with progression of therapeutic exercises, CKC-Functional activities, and Manual therapy. He has improved, but continues to have deficits in His Trunk-Core and Bilateral Hip  ROM,  Strength, and Stability; limiting functional mobility and ability to perform ADLs without increased pain and difficulty  at this time.  Pt continues to exhibit significant tightness in lower back and left greater than right hip/knee.  He responds well to manual therapy - reporting decrease in pain and tightness post session.  Continued positive response to Trials of Long Axis Distraction with left LE- as pt reported slight reduction of radicular symptoms with distraction.  He does however, continue to report having same pain and \"Sciatica\" if he sits or stands to long.     Progress per Plan of Care- as tolerated.               Timed:  Manual Therapy:    10     mins  25841;  Therapeutic Exercise:    22     mins  04785;     Neuromuscular Yuko:    0    mins  29490;  "   Therapeutic Activity:     10     mins  90194;     Gait Trainin     mins  28707;     Ultrasound:     0     mins  74000;    Electrical Stimulation:    0     mins  49949;  Iontophoresis     0     mins  20460    Untimed:  Electrical Stimulation:    0     mins  62900 ( );  Mechanical Traction:    0     mins  39821;   Fluidotherapy     0     mins  60295  Hot pack     0     mins  82613  Cold pack     0     mins  10975    Timed Treatment:   42   mins   Total Treatment:     42   mins        Samia Almeida PTA  Physical Therapist Assistant

## 2025-03-07 ENCOUNTER — TREATMENT (OUTPATIENT)
Dept: PHYSICAL THERAPY | Facility: CLINIC | Age: 55
End: 2025-03-07
Payer: OTHER GOVERNMENT

## 2025-03-07 DIAGNOSIS — R29.898 IMPAIRED FLEXIBILITY OF LOWER EXTREMITY: ICD-10-CM

## 2025-03-07 DIAGNOSIS — M54.16 RADICULOPATHY, LUMBAR REGION: Primary | ICD-10-CM

## 2025-03-07 DIAGNOSIS — R29.898 WEAKNESS OF BOTH LOWER EXTREMITIES: ICD-10-CM

## 2025-03-07 NOTE — PROGRESS NOTES
Progress Assessment  [address] [facility] [address] [place] [address]        Patient: Raciel Mancini   : 1970  Diagnosis/ICD-10 Code:  Radiculopathy, lumbar region [M54.16]  Referring practitioner: Akil Patino MD  Date of Initial Visit: Type: THERAPY  Noted: 2025  Today's Date: 3/7/2025  Patient seen for 5 sessions      Subjective:   Raciel Mancini reports: 1/10  Subjective Questionnaire: Oswestry:   Clinical Progress: unchanged  Home Program Compliance: Yes  Treatment has included: therapeutic exercise, neuromuscular re-education, manual therapy, and therapeutic activity    Subjective     Pt reports since starting PT he is 25% better. Pt reports he hasn't had L radicular pain recently, but reports he hasn't drove a long distance either which typically causes this pain.     Objective       Active Range of Motion      Additional Active Range of Motion Details  Lumbar flexion: limited by 25%  Lumbar extension: 0 degrees  SB to R: R knee  SB to L: L knee  Rotation to R: Limited by 50%  Rotation to the L: Limited by 50%       Strength/Myotome Testing      Left Hip   Planes of Motion   Flexion: 4+  Extension: 4-  Abduction: 4+  Adduction: 4+     Right Hip   Planes of Motion   Flexion: 4+  Extension: 4-  Abduction: 5  Adduction: 5     Left Knee   Flexion: 5  Extension: 5     Right Knee   Flexion: 5  Extension: 5    Assessment/Plan    Pt has made minimal progress with improving lumbar ROM and strength since starting PT. Pt currently isn't experiencing radicular pain down L LE, which is progress, but patient hasn't performed a long drive recently. Pt does have a trip coming up at the end of the month. Pt does require skilled PT in order to address deficits listed to return patient back to maximum function such as prolonged sitting and walking with less pain. Discussed with patient that will continue with PT, but if minimal progress is made will discharge in a month. Pt agreed with plan.     Goals  Plan  Goals: LUMBAR PROBLEMS:     1. The patient has complaints of pain.     LTG 1: 12 weeks: The patient will report lower back pain no greater than 1/10 in order to improve tolerance with activities of daily living and improve sleep quality.     STATUS: MET - NOT CONSISTENTLY     STG 1a: 6 weeks: The patient will report lower back pain no greater than 3/10.      STATUS: MET        2. The patient has limited lumbar spine ROM.     LTG 2: 12 weeks: The patient will improve lumbar spine ROM to 100% in order to improve tolerance with activities of daily living.     STATUS: IN PROGRESS        STG 2a: 6 weeks: The patient will improve lumbar spine ROM to 80% in order to improve tolerance with activities of daily living.     STATUS: IN PROGRESS     3. The patient demonstrates weakness of the bilateral hip.     LTG 3: 12 weeks: The patient will demonstrate 5/5 strength for bilateral hip flexion, abduction, and extension in order to improve hip stability.     STATUS: IN PROGRESS        STG 3a: 6 weeks: The patient will demonstrate 4+/5 strength for bilateral hip flexion, abduction, and extension.     STATUS: IN PROGRESS        Mobility: Walking/Moving Around Functional Limitation     LTG 4: 12 weeks: The patient will demonstrate 1-19% limitation by achieving a score of 8 on the Oswestry Disability Questionnaire.     STATUS: IN PROGRESS     STG 4a: 6 weeks: The patient will demonstrate 20-39% limitation by achieving a score of 16 on the Oswestry Disability Quiestionnaire.     STATUS: IN PROGRESS    Progress toward previous goals: Partially Met    See Exercise, Manual, and Modality Logs for complete treatment.         Recommendations: Continue with recommendations 1x a week for 4 weeks   Timeframe: 1 month  Prognosis to achieve goals: fair    PT SIGNATURE: Electronically signed by Vinny Nicolas, [facility] LICENSE: [id number]    Based upon review of the patient's progress and continued therapy plan, it is my medical opinion that  Raciel Mancini should continue physical therapy treatment at [facility] PHYSICAL THERAPY  [address]  [facility] [address] [phone].      Timed:                 Manual Therapy:    8     mins  05823;     Therapeutic Exercise:    14     mins  51348;     Neuromuscular Yuko:    0    mins  61056;    Therapeutic Activity:     8     mins  82076;     Gait Trainin     mins  63259;     Ultrasound:     0     mins  87859;    Ionto                               0    mins   70235  Self pay                         0     mins [email or web address]    Un-Timed:  Electrical Stimulation:    0     mins  82288 ( )  Canalith Repos    0     mins 41354  Dry Needling     0     mins self-pay  Traction     0     mins 96462  Re-evaluation     8     mins 39974    Timed Treatment:   30   mins   Total Treatment:     38   mins      I certify that the therapy services are furnished while this patient is under my care.  The services outlined above are required by this patient, and will be reviewed every 90 days.

## 2025-03-13 ENCOUNTER — TREATMENT (OUTPATIENT)
Dept: PHYSICAL THERAPY | Facility: CLINIC | Age: 55
End: 2025-03-13
Payer: OTHER GOVERNMENT

## 2025-03-13 DIAGNOSIS — R29.898 WEAKNESS OF BOTH LOWER EXTREMITIES: ICD-10-CM

## 2025-03-13 DIAGNOSIS — M54.16 RADICULOPATHY, LUMBAR REGION: Primary | ICD-10-CM

## 2025-03-13 DIAGNOSIS — R29.898 IMPAIRED FLEXIBILITY OF LOWER EXTREMITY: ICD-10-CM

## 2025-03-13 NOTE — PROGRESS NOTES
"Physical Therapy Daily Treatment Note  [address] [facility], [address] [place] [address]        Patient: Raciel Mancini   : 1970  Diagnosis/ICD-10 Code:  Radiculopathy, lumbar region [M54.16]  Referring practitioner: Akil Patino MD  Date of Initial Visit: Type: THERAPY  Noted: 2025  Today's Date: 3/13/2025  Patient seen for 6 sessions             Subjective   Raciel Mancini reports having only mild discomfort at his Right Lumbosacral region upon arrival today.  He states that he has noticed less \"Sciatic\" like symptoms in his left Leg, but states he has felt some on his right over the past couple of days.  He states that he feels like his symptoms are aggravated by prolonged sitting during work.    Objective     + Tightness Bilateral Hip rotators and Jt mobility  + Tightness Bilateral Hamstring    + Significant Hypomobility Lumbosacral region  (Min to No trunk movement noted during attempts at Quadruped Cat/Camel exercise)    See Exercise and Manual Logs for complete treatment.       Assessment/Plan    Pt tolerated therapy session well - with progression of therapeutic exercises, CKC-Functional activities, and Manual therapy. He has improved, but continues to have deficits in His Trunk-Core and Bilateral Hip  ROM,  Strength, and Stability; limiting functional mobility and ability to perform ADLs without increased pain and difficulty  at this time.  Pt continues to exhibit significant tightness in lower back and left greater than right hip/knee.  He responds well to manual therapy - reporting decrease in pain and tightness post session.  Continued positive response to Trials of Long Axis Distraction with left LE- as pt reported slight reduction of radicular symptoms/Pain with distraction.  He does however, continue to report having same pain and \"Sciatica\" if he sits or stands for prolonged period of time.  Blue Theraband issued for HEP progression.    Discussed importance of consistent daily stretching " of Trunk and Bilateral lower extremities for long term management of pain and hypomobility.     Progress per Plan of Care- as tolerated- Plan to updated and finalize HEP next session prior to discharge.               Timed:  Manual Therapy:    10     mins  14123;  Therapeutic Exercise:    22     mins  74052;     Neuromuscular Yuko:    0    mins  40229;    Therapeutic Activity:     10     mins  40426;     Gait Trainin     mins  33540;     Ultrasound:     0     mins  02980;    Electrical Stimulation:    00     mins  91466;  Iontophoresis     0     mins  14938    Untimed:  Electrical Stimulation:    0     mins  81304 ( );  Mechanical Traction:    0     mins  39743;   Fluidotherapy     0     mins  14294  Hot pack     0     mins  71895  Cold pack     0     mins  20723    Timed Treatment:   42   mins   Total Treatment:     42   mins        Samia Almeida PTA  Physical Therapist Assistant

## 2025-03-26 ENCOUNTER — TREATMENT (OUTPATIENT)
Dept: PHYSICAL THERAPY | Facility: CLINIC | Age: 55
End: 2025-03-26
Payer: OTHER GOVERNMENT

## 2025-03-26 DIAGNOSIS — R29.898 WEAKNESS OF BOTH LOWER EXTREMITIES: ICD-10-CM

## 2025-03-26 DIAGNOSIS — M54.16 RADICULOPATHY, LUMBAR REGION: Primary | ICD-10-CM

## 2025-03-26 DIAGNOSIS — R29.898 IMPAIRED FLEXIBILITY OF LOWER EXTREMITY: ICD-10-CM

## 2025-03-26 NOTE — PROGRESS NOTES
Physical Therapy Daily Treatment Note  [address], [address] [place] [address]        Patient: Raciel Mancini   : 1970  Diagnosis/ICD-10 Code:  Radiculopathy, lumbar region [M54.16]  Referring practitioner: Akil Patino MD  Date of Initial Visit: Type: THERAPY  Noted: 2025  Today's Date: 3/26/2025  Patient seen for 7 sessions             Subjective   Raciel Mancini reports that he just got back from trip to [place] and states that his back felt better and had less pain after the car ride.  He denies having any pain upon arrival today.  He reports that he feels comfortable with continuing with HEP on his own and discharging from formal therapy at this time.    Objective        HEP updated:    [facility]:  Access Code:  [email or web address]  Hooklying Posterior Pelvic Tilt  Hooklying Hip Adductor Sets with ball and PPT  Hooklying Clamshell with resistance band (Blue)  Supine March with Trunk Stabilization  Supine Double Leg Bridging  Hooklying Single Knee to Chest Stretch  Supine Piriformis 2 way Stretch  Supine Lower Trunk Rotation  Supine Hamstring Stretch with ankle Flossing  Seated Trunk Flexion stretch with Swiss Ball  Seated Trunk lateral flexion/rotation stretch with Swiss ball       + Tightness Bilateral Hip rotators and Jt mobility  + Tightness Bilateral Hamstring     + Significant Hypomobility Lumbosacral region  (Min to No trunk movement noted during attempts at Quadruped Cat/Camel exercise)     See Exercise  Logs for complete treatment.     Assessment/Plan    Pt tolerated therapy session well - with progression of therapeutic exercises and CKC-Functional activities. He has improved, but continues to have deficits in His Trunk-Core and Bilateral Hip  ROM,  Strength, and Stability; limiting functional mobility and ability to perform ADLs without increased pain and difficulty  at this time.  Pt continues to exhibit significant tightness in lower back and left greater than right hip/knee.  He  "responds well to manual and self stretches - reporting decrease in pain and tightness post sessions.   He does however, continue to report having same pain and \"Sciatica\" if he sits or stands for prolonged period of time.  HEP updated this date to finalize program .  Written/Verbal instruction given and Blue Theraband issued for HEP progression.    Discussed importance of consistent daily stretching of Trunk and Bilateral lower extremities for long term management of pain and hypomobility.  Pt verbalized good understanding and agreement.      See Progress note/30 day Re-assessment dated 3/7/2025 for updated objective measurements.     Plan:      Pt discharged from formal physical therapy at this time.  He has been instructed to continue with HEP and to follow-up with MD as needed.  Pt verbalized good understanding and agreement with plan.               Timed:  Manual Therapy:    0     mins  59385;  Therapeutic Exercise:    20     mins  93691;     Neuromuscular [place]o:    0    mins  25934;    Therapeutic Activity:     10     mins  56497;     Gait Trainin     mins  14554;     Ultrasound:     0     mins  68485;    Electrical Stimulation:    0     mins  82053;  Iontophoresis     0     mins  65136    Untimed:  Electrical Stimulation:    0     mins  58761 ( );  Mechanical Traction:    0     mins  05596;   Fluidotherapy     0     mins  61363  Hot pack     0     mins  43658  Cold pack     0     mins  49854    Timed Treatment:   30   mins   Total Treatment:     30   mins        Samia Almeida PTA  Physical Therapist Assistant  "

## 2025-07-28 ENCOUNTER — HOSPITAL ENCOUNTER (EMERGENCY)
Facility: HOSPITAL | Age: 55
Discharge: HOME | End: 2025-07-28
Attending: EMERGENCY MEDICINE | Admitting: EMERGENCY MEDICINE
Payer: OTHER GOVERNMENT

## 2025-07-28 VITALS
DIASTOLIC BLOOD PRESSURE: 81 MMHG | OXYGEN SATURATION: 99 % | WEIGHT: 225.09 LBS | TEMPERATURE: 97.5 F | HEART RATE: 62 BPM | SYSTOLIC BLOOD PRESSURE: 124 MMHG | HEIGHT: 71 IN | RESPIRATION RATE: 18 BRPM | BODY MASS INDEX: 31.51 KG/M2

## 2025-07-28 DIAGNOSIS — K21.00 GASTROESOPHAGEAL REFLUX DISEASE WITH ESOPHAGITIS WITHOUT HEMORRHAGE: Primary | ICD-10-CM

## 2025-07-28 LAB
ALBUMIN SERPL-MCNC: 4.5 G/DL (ref 3.5–5.2)
ALBUMIN/GLOB SERPL: 1.7 G/DL
ALP SERPL-CCNC: 67 U/L (ref 39–117)
ALT SERPL W P-5'-P-CCNC: 27 U/L (ref 1–41)
ANION GAP SERPL CALCULATED.3IONS-SCNC: 10.5 MMOL/L (ref 5–15)
AST SERPL-CCNC: 19 U/L (ref 1–40)
BASOPHILS # BLD AUTO: 0.02 10*3/MM3 (ref 0–0.2)
BASOPHILS NFR BLD AUTO: 0.5 % (ref 0–1.5)
BILIRUB SERPL-MCNC: 0.4 MG/DL (ref 0–1.2)
BILIRUB UR QL STRIP: NEGATIVE
BUN SERPL-MCNC: 12 MG/DL (ref 6–20)
BUN/CREAT SERPL: 11.5 (ref 7–25)
CALCIUM SPEC-SCNC: 9 MG/DL (ref 8.6–10.5)
CHLORIDE SERPL-SCNC: 106 MMOL/L (ref 98–107)
CLARITY UR: CLEAR
CO2 SERPL-SCNC: 23.5 MMOL/L (ref 22–29)
COLOR UR: YELLOW
CREAT SERPL-MCNC: 1.04 MG/DL (ref 0.76–1.27)
DEPRECATED RDW RBC AUTO: 46.6 FL (ref 37–54)
EGFRCR SERPLBLD CKD-EPI 2021: 84.3 ML/MIN/1.73
EOSINOPHIL # BLD AUTO: 0.08 10*3/MM3 (ref 0–0.4)
EOSINOPHIL NFR BLD AUTO: 2 % (ref 0.3–6.2)
ERYTHROCYTE [DISTWIDTH] IN BLOOD BY AUTOMATED COUNT: 14.1 % (ref 12.3–15.4)
GEN 5 1HR TROPONIN T REFLEX: <6 NG/L
GLOBULIN UR ELPH-MCNC: 2.6 GM/DL
GLUCOSE SERPL-MCNC: 89 MG/DL (ref 65–99)
GLUCOSE UR STRIP-MCNC: NEGATIVE MG/DL
HCT VFR BLD AUTO: 40.8 % (ref 37.5–51)
HGB BLD-MCNC: 13.9 G/DL (ref 13–17.7)
HGB UR QL STRIP.AUTO: NEGATIVE
HOLD SPECIMEN: NORMAL
HOLD SPECIMEN: NORMAL
IMM GRANULOCYTES # BLD AUTO: 0.01 10*3/MM3 (ref 0–0.05)
IMM GRANULOCYTES NFR BLD AUTO: 0.2 % (ref 0–0.5)
KETONES UR QL STRIP: ABNORMAL
LEUKOCYTE ESTERASE UR QL STRIP.AUTO: NEGATIVE
LIPASE SERPL-CCNC: 25 U/L (ref 13–60)
LYMPHOCYTES # BLD AUTO: 1.08 10*3/MM3 (ref 0.7–3.1)
LYMPHOCYTES NFR BLD AUTO: 26.6 % (ref 19.6–45.3)
MCH RBC QN AUTO: 30.8 PG (ref 26.6–33)
MCHC RBC AUTO-ENTMCNC: 34.1 G/DL (ref 31.5–35.7)
MCV RBC AUTO: 90.5 FL (ref 79–97)
MONOCYTES # BLD AUTO: 0.36 10*3/MM3 (ref 0.1–0.9)
MONOCYTES NFR BLD AUTO: 8.9 % (ref 5–12)
NEUTROPHILS NFR BLD AUTO: 2.51 10*3/MM3 (ref 1.7–7)
NEUTROPHILS NFR BLD AUTO: 61.8 % (ref 42.7–76)
NITRITE UR QL STRIP: NEGATIVE
NRBC BLD AUTO-RTO: 0 /100 WBC (ref 0–0.2)
PH UR STRIP.AUTO: 5.5 [PH] (ref 5–8)
PLATELET # BLD AUTO: 212 10*3/MM3 (ref 140–450)
PMV BLD AUTO: 10.2 FL (ref 6–12)
POTASSIUM SERPL-SCNC: 4 MMOL/L (ref 3.5–5.2)
PROT SERPL-MCNC: 7.1 G/DL (ref 6–8.5)
PROT UR QL STRIP: NEGATIVE
RBC # BLD AUTO: 4.51 10*6/MM3 (ref 4.14–5.8)
SODIUM SERPL-SCNC: 140 MMOL/L (ref 136–145)
SP GR UR STRIP: 1.03 (ref 1–1.03)
TROPONIN T NUMERIC DELTA: NORMAL
TROPONIN T SERPL HS-MCNC: <6 NG/L
UROBILINOGEN UR QL STRIP: ABNORMAL
WBC NRBC COR # BLD AUTO: 4.06 10*3/MM3 (ref 3.4–10.8)
WHOLE BLOOD HOLD COAG: NORMAL
WHOLE BLOOD HOLD SPECIMEN: NORMAL

## 2025-07-28 PROCEDURE — 84484 ASSAY OF TROPONIN QUANT: CPT

## 2025-07-28 PROCEDURE — 85025 COMPLETE CBC W/AUTO DIFF WBC: CPT

## 2025-07-28 PROCEDURE — 81003 URINALYSIS AUTO W/O SCOPE: CPT

## 2025-07-28 PROCEDURE — 83690 ASSAY OF LIPASE: CPT

## 2025-07-28 PROCEDURE — 80053 COMPREHEN METABOLIC PANEL: CPT

## 2025-07-28 PROCEDURE — 93005 ELECTROCARDIOGRAM TRACING: CPT

## 2025-07-28 PROCEDURE — 84484 ASSAY OF TROPONIN QUANT: CPT | Performed by: EMERGENCY MEDICINE

## 2025-07-28 PROCEDURE — 93005 ELECTROCARDIOGRAM TRACING: CPT | Performed by: EMERGENCY MEDICINE

## 2025-07-28 PROCEDURE — 99283 EMERGENCY DEPT VISIT LOW MDM: CPT

## 2025-07-28 PROCEDURE — 36415 COLL VENOUS BLD VENIPUNCTURE: CPT | Performed by: EMERGENCY MEDICINE

## 2025-07-28 RX ORDER — ALUMINA, MAGNESIA, AND SIMETHICONE 2400; 2400; 240 MG/30ML; MG/30ML; MG/30ML
15 SUSPENSION ORAL ONCE
Status: COMPLETED | OUTPATIENT
Start: 2025-07-28 | End: 2025-07-28

## 2025-07-28 RX ORDER — LIDOCAINE HYDROCHLORIDE 20 MG/ML
5 SOLUTION OROPHARYNGEAL ONCE
Status: COMPLETED | OUTPATIENT
Start: 2025-07-28 | End: 2025-07-28

## 2025-07-28 RX ORDER — SODIUM CHLORIDE 0.9 % (FLUSH) 0.9 %
10 SYRINGE (ML) INJECTION AS NEEDED
Status: DISCONTINUED | OUTPATIENT
Start: 2025-07-28 | End: 2025-07-28 | Stop reason: HOSPADM

## 2025-07-28 RX ORDER — SUCRALFATE 1 G/1
1 TABLET ORAL 4 TIMES DAILY
Qty: 56 TABLET | Refills: 0 | Status: SHIPPED | OUTPATIENT
Start: 2025-07-28 | End: 2025-08-11

## 2025-07-28 RX ORDER — PANTOPRAZOLE SODIUM 40 MG/10ML
40 INJECTION, POWDER, LYOPHILIZED, FOR SOLUTION INTRAVENOUS ONCE
Status: DISCONTINUED | OUTPATIENT
Start: 2025-07-28 | End: 2025-07-28 | Stop reason: HOSPADM

## 2025-07-28 RX ORDER — PANTOPRAZOLE SODIUM 40 MG/1
40 TABLET, DELAYED RELEASE ORAL DAILY
Qty: 30 TABLET | Refills: 0 | Status: SHIPPED | OUTPATIENT
Start: 2025-07-28 | End: 2025-08-27

## 2025-07-28 RX ADMIN — LIDOCAINE HYDROCHLORIDE 5 ML: 20 SOLUTION ORAL at 18:18

## 2025-07-28 RX ADMIN — ALUMINUM HYDROXIDE, MAGNESIUM HYDROXIDE, AND DIMETHICONE 15 ML: 400; 400; 40 SUSPENSION ORAL at 18:18

## 2025-07-28 NOTE — DISCHARGE INSTRUCTIONS
Please follow-up with your gastroenterologist.  Please discuss need for EGD    Please return to the emergency room immediately for intractable vomiting, unable to swallow food and liquids, chest pain, chest pressure, shortness of breath, unusual sweating, near passing out, passing out or any new symptoms you are concerned with

## 2025-07-28 NOTE — ED PROVIDER NOTES
Time: 2:44 PM EDT  Date of encounter:  7/28/2025  Independent Historian/Clinical History and Information was obtained by:   Patient    History is limited by: N/A    Chief Complaint: Epigastric pain      History of Present Illness:  Patient is a 55 y.o. year old male who presents to the emergency department for evaluation of epigastric pain.  Patient states that it has been going on since Saturday but is progressively gotten worse.  Patient denies vomiting but states it feels like he could vomit.  Patient states he feels pain when he swallows anything especially coffee or food.  Patient denies any recent stressors.  Patient does have a history of GERD but has never been placed on daily medication.  Patient also has history of IBS so he does have frequent diarrhea but denies any abdominal surgeries.  Patient denies any shortness of breath.  The patient denies any chest pressure.  The patient has no radiation of pain to the neck, jaw, back or down the arm.  He has no diaphoresis.  He notes that sometimes the food does feel like it is getting stuck but then goes down.  He states he is never actually vomited.      Patient Care Team  Primary Care Provider: Kalie Arias MD    Past Medical History:     Allergies   Allergen Reactions    Soap Hives     Laundry detergent      Past Medical History:   Diagnosis Date    Arthritis     Diarrhea     GERD (gastroesophageal reflux disease)     High cholesterol     Hyperlipemia     Hyperlipidemia     Irritable bowel syndrome     Nerve damage     bilateral wrists    Rectal bleeding      Past Surgical History:   Procedure Laterality Date    COLONOSCOPY      COLONOSCOPY N/A 9/23/2021    Procedure: COLONOSCOPY;  Surgeon: Hanane Tapia MD;  Location: [facility] ENDOSCOPY;  Service: Gastroenterology;  Laterality: N/A;  NORMAL COLONOSCOPY    HEMORRHOIDECTOMY       Family History   Problem Relation Age of Onset    Diabetes Mother     Other Mother         Bladder stones     Arthritis Mother     Prostate cancer Father     Arthritis Father     Cancer Father     Cancer Brother     Diabetes Brother     Arthritis Brother     Prostate cancer Paternal Grandfather     Diabetes Brother        Home Medications:  Prior to Admission medications    Medication Sig Start Date End Date Taking? Authorizing Provider   colestipol (COLESTID) 1 g tablet Take 2 tablets by mouth 2 (Two) Times a Day. 25   Corazon Barros APRN   Omega-3 Fatty Acids (fish oil) 1000 MG capsule capsule Take 1 capsule by mouth Daily With Breakfast.    Emergency, Nurse Epic, RN   rosuvastatin (CRESTOR) 10 MG tablet Take 1 tablet by mouth Daily.    Provider, MD Dwight   vitamin D (ERGOCALCIFEROL) 1.25 MG (23503 UT) capsule capsule Take 1 capsule by mouth Daily.    Provider, MD Dwight        Social History:   Social History     Tobacco Use    Smoking status: Former     Current packs/day: 0.00     Average packs/day: 0.5 packs/day for 15.0 years (7.5 ttl pk-yrs)     Types: Cigarettes     Start date: 2002     Quit date: 2017     Years since quittin.6    Smokeless tobacco: Never    Tobacco comments:     STARTED AGE 19, QUIT AGE 32. SMOKED 10 CIGARETTES PER DAY.   Vaping Use    Vaping status: Never Used   Substance Use Topics    Alcohol use: Yes     Alcohol/week: 3.0 standard drinks of alcohol     Types: 3 Cans of beer per week     Comment: OCCASIONALLY DRINKS, LESS THAN 1 PER DAY. BEEN DRINKING FOR 21-30 YEARS.    Drug use: Never         Review of Systems:  Review of Systems   Constitutional:  Negative for chills, diaphoresis and fever.   HENT:  Negative for congestion, postnasal drip, rhinorrhea and sore throat.    Eyes:  Negative for photophobia.   Respiratory:  Negative for cough, chest tightness and shortness of breath.    Cardiovascular:  Negative for chest pain, palpitations and leg swelling.   Gastrointestinal:  Positive for abdominal pain and nausea. Negative for diarrhea and vomiting.  "  Genitourinary:  Negative for difficulty urinating, dysuria, flank pain, frequency, hematuria and urgency.   Musculoskeletal:  Negative for neck pain and neck stiffness.   Skin:  Negative for pallor and rash.   Neurological:  Negative for dizziness, syncope, weakness, numbness and headaches.   Hematological:  Negative for adenopathy. Does not bruise/bleed easily.   Psychiatric/Behavioral: Negative.          Physical Exam:  /77   Pulse 57   Temp 97.5 °F (36.4 °C)   Resp 20   Ht 180.3 cm (71\")   Wt 102 kg (225 lb 1.4 oz)   SpO2 100%   BMI 31.39 kg/m²     Physical Exam  Vitals and nursing note reviewed.   Constitutional:       General: He is not in acute distress.     Appearance: Normal appearance. He is not ill-appearing, toxic-appearing or diaphoretic.   HENT:      Head: Normocephalic and atraumatic.      Mouth/Throat:      Mouth: Mucous membranes are moist.   Eyes:      Extraocular Movements: Extraocular movements intact.      Conjunctiva/sclera: Conjunctivae normal.      Pupils: Pupils are equal, round, and reactive to light.   Cardiovascular:      Rate and Rhythm: Normal rate and regular rhythm.      Pulses: Normal pulses.           Carotid pulses are 2+ on the right side and 2+ on the left side.       Radial pulses are 2+ on the right side and 2+ on the left side.        Femoral pulses are 2+ on the right side and 2+ on the left side.       Popliteal pulses are 2+ on the right side and 2+ on the left side.        Dorsalis pedis pulses are 2+ on the right side and 2+ on the left side.        Posterior tibial pulses are 2+ on the right side and 2+ on the left side.      Heart sounds: Normal heart sounds. No murmur heard.  Pulmonary:      Effort: Pulmonary effort is normal. No accessory muscle usage, respiratory distress or retractions.      Breath sounds: Normal breath sounds. No wheezing, rhonchi or rales.   Abdominal:      General: Abdomen is flat. There is no distension.      Palpations: Abdomen is " soft. There is no mass or pulsatile mass.      Tenderness: There is abdominal tenderness in the epigastric area. There is no right CVA tenderness, left CVA tenderness, guarding or rebound.      Comments: No rigidity   Musculoskeletal:         General: No swelling, tenderness or deformity.      Cervical back: Neck supple. No tenderness.      Right lower leg: No edema.      Left lower leg: No edema.   Skin:     General: Skin is warm and dry.      Capillary Refill: Capillary refill takes less than 2 seconds.      Coloration: Skin is not cyanotic, jaundiced or pale.      Findings: No erythema.   Neurological:      General: No focal deficit present.      Mental Status: He is alert and oriented to person, place, and time. Mental status is at baseline.      Cranial Nerves: Cranial nerves 2-12 are intact. No cranial nerve deficit.      Sensory: Sensation is intact. No sensory deficit.      Motor: Motor function is intact. No weakness or pronator drift.      Coordination: Coordination is intact. Coordination normal.   Psychiatric:         Attention and Perception: Attention and perception normal.         Mood and Affect: Mood normal.         Behavior: Behavior normal.                    Medical Decision Making:      Comorbidities that affect care:    Hyperlipidemia, GERD, irritable bowel l syndrome    External Notes reviewed:    None      The following orders were placed and all results were independently analyzed by me:  Orders Placed This Encounter   Procedures    [facility] Draw    Comprehensive Metabolic Panel    Lipase    High Sensitivity Troponin T    Urinalysis With Microscopic If Indicated (No Culture) - Urine, Clean Catch    CBC Auto Differential    High Sensitivity Troponin T 1Hr    NPO Diet NPO Type: Strict NPO    Undress & Gown    Continuous Pulse Oximetry    Vital Signs    Oxygen Therapy- Nasal Cannula; Titrate 1-6 LPM Per SpO2; 90 - 95%    ECG 12 Lead Other; upper abd pain    Insert Peripheral IV    CBC &  Differential    Green Top (Gel)    Lavender Top    Gold Top - SST    Light Blue Top       Medications Given in the Emergency Department:  Medications   sodium chloride 0.9 % flush 10 mL (has no administration in time range)   pantoprazole (PROTONIX) injection 40 mg (has no administration in time range)   aluminum-magnesium hydroxide-simethicone (MAALOX MAX) 400-400-40 MG/5ML suspension 15 mL (15 mL Oral Given 7/28/25 1818)   Lidocaine Viscous HCl (XYLOCAINE) 2 % solution 5 mL (5 mL Mouth/Throat Given 7/28/25 1818)        ED Course:    ED Course as of 07/28/25 1929 Mon Jul 28, 2025   1451 PROVIDER IN TRIAGE  Patient was evaluated by me in triage, Bailey Seaver, APRN, RHONDA.  Orders were placed and patient is currently awaiting final results and disposition.   [AS]   1928 EKG:    Rhythm: Sinus bradycardia  Rate: 58  Intervals: Normal NV and QT interval  T-wave: Isolated nonspecific T wave inversion III  ST Segment: No pathological ST elevation or reciprocal ST depression to suggest STEMI    EKG Comparison: No EKG available for comparison    Interpreted by me   [SD]      ED Course User Index  [AS] Seaver, Alyce B, APRN  [SD] Kp Dominguez DO       Labs:    Lab Results (last 24 hours)       Procedure Component Value Units Date/Time    CBC & Differential [[id number]]  (Normal) Collected: 07/28/25 1420    Specimen: Blood Updated: 07/28/25 1500    Narrative:      The following orders were created for panel order CBC & Differential.  Procedure                               Abnormality         Status                     ---------                               -----------         ------                     CBC Auto Differential[[id number]]        Normal              Final result                 Please view results for these tests on the individual orders.    Comprehensive Metabolic Panel [[id number]] Collected: 07/28/25 1420    Specimen: Blood Updated: 07/28/25 1500     Glucose 89 mg/dL      BUN 12.0 mg/dL      Creatinine  1.04 mg/dL      Sodium 140 mmol/L      Potassium 4.0 mmol/L      Chloride 106 mmol/L      CO2 23.5 mmol/L      Calcium 9.0 mg/dL      Total Protein 7.1 g/dL      Albumin 4.5 g/dL      ALT (SGPT) 27 U/L      AST (SGOT) 19 U/L      Alkaline Phosphatase 67 U/L      Total Bilirubin 0.4 mg/dL      Globulin 2.6 gm/dL      A/G Ratio 1.7 g/dL      BUN/Creatinine Ratio 11.5     Anion Gap 10.5 mmol/L      eGFR 84.3 mL/min/1.73     Narrative:      GFR Categories in Chronic Kidney Disease (CKD)              GFR Category          GFR (mL/min/1.73)    Interpretation  G1                    90 or greater        Normal or high (1)  G2                    60-89                Mild decrease (1)  G3a                   45-59                Mild to moderate decrease  G3b                   30-44                Moderate to severe decrease  G4                    15-29                Severe decrease  G5                    14 or less           Kidney failure    (1)In the absence of evidence of kidney disease, neither GFR category G1 or G2 fulfill the criteria for CKD.    eGFR calculation 2021 CKD-EPI creatinine equation, which does not include race as a factor    Lipase [[id number]]  (Normal) Collected: 07/28/25 1420    Specimen: Blood Updated: 07/28/25 1500     Lipase 25 U/L     High Sensitivity Troponin T [[id number]]  (Normal) Collected: 07/28/25 1420    Specimen: Blood Updated: 07/28/25 1500     HS Troponin T <6 ng/L     Narrative:      High Sensitive Troponin T Reference Range:  <14.0 ng/L- Negative Female for AMI  <22.0 ng/L- Negative Male for AMI  >=14 - Abnormal Female indicating possible myocardial injury.  >=22 - Abnormal Male indicating possible myocardial injury.   Clinicians would have to utilize clinical acumen, EKG, Troponin, and serial changes to determine if it is an Acute Myocardial Infarction or myocardial injury due to an underlying chronic condition.         CBC Auto Differential [[id number]]  (Normal) Collected: 07/28/25  1420    Specimen: Blood Updated: 07/28/25 1500     WBC 4.06 10*3/mm3      RBC 4.51 10*6/mm3      Hemoglobin 13.9 g/dL      Hematocrit 40.8 %      MCV 90.5 fL      MCH 30.8 pg      MCHC 34.1 g/dL      RDW 14.1 %      RDW-SD 46.6 fl      MPV 10.2 fL      Platelets 212 10*3/mm3      Neutrophil % 61.8 %      Lymphocyte % 26.6 %      Monocyte % 8.9 %      Eosinophil % 2.0 %      Basophil % 0.5 %      Immature Grans % 0.2 %      Neutrophils, Absolute 2.51 10*3/mm3      Lymphocytes, Absolute 1.08 10*3/mm3      Monocytes, Absolute 0.36 10*3/mm3      Eosinophils, Absolute 0.08 10*3/mm3      Basophils, Absolute 0.02 10*3/mm3      Immature Grans, Absolute 0.01 10*3/mm3      nRBC 0.0 /100 WBC     Urinalysis With Microscopic If Indicated (No Culture) - Urine, Clean Catch [[id number]]  (Abnormal) Collected: 07/28/25 1525    Specimen: Urine, Clean Catch Updated: 07/28/25 1540     Color, UA Yellow     Appearance, UA Clear     pH, UA 5.5     Specific Gravity, UA 1.027     Glucose, UA Negative     Ketones, UA Trace     Bilirubin, UA Negative     Blood, UA Negative     Protein, UA Negative     Leuk Esterase, UA Negative     Nitrite, UA Negative     Urobilinogen, UA 1.0 E.U./dL    Narrative:      Urine microscopic not indicated.    High Sensitivity Troponin T 1Hr [[id number]] Collected: 07/28/25 1826    Specimen: Blood from Arm, Right Updated: 07/28/25 1851     HS Troponin T <6 ng/L      Troponin T Numeric Delta --     Comment: Unable to calculate.       Narrative:      High Sensitive Troponin T Reference Range:  <14.0 ng/L- Negative Female for AMI  <22.0 ng/L- Negative Male for AMI  >=14 - Abnormal Female indicating possible myocardial injury.  >=22 - Abnormal Male indicating possible myocardial injury.   Clinicians would have to utilize clinical acumen, EKG, Troponin, and serial changes to determine if it is an Acute Myocardial Infarction or myocardial injury due to an underlying chronic condition.                  Imaging:    No  Radiology Exams Resulted Within Past 24 Hours      Differential Diagnosis and Discussion:    Abdominal Pain: Based on the patient's signs and symptoms, I considered abdominal aortic aneurysm, small bowel obstruction, pancreatitis, acute cholecystitis, acute appendecitis, peptic ulcer disease, gastritis, colitis, endocrine disorders, irritable bowel syndrome and other differential diagnosis an etiology of the patient's abdominal pain.    PROCEDURES:    Labs were collected in the emergency department and all labs were reviewed and interpreted by me.  An EKG was performed and the EKG was interpreted by me.    ECG 12 Lead Other; upper abd pain   Preliminary Result   HEART RATE=58  bpm   RR Mwrtacbz=0763  ms   NH Xwebbgsf=131  ms   P Horizontal Axis=33  deg   P Front Axis=16  deg   QRSD Interval=90  ms   QT Wgmikxgr=589  ms   KWyD=452  ms   QRS Axis=-23  deg   T Wave Axis=-5  deg   - OTHERWISE NORMAL ECG -   Sinus rhythm   Borderline left axis deviation   Abnormal R-wave progression, late transition   Date and Time of Study:2025-07-28 14:13:57          Procedures    MDM  Number of Diagnoses or Management Options  Gastroesophageal reflux disease with esophagitis without hemorrhage  Diagnosis management comments: The patient's CBC was reviewed and shows no abnormalities of critical concern.  Of note, there is no anemia requiring a blood transfusion and the platelet count is acceptable    The patient's CMP was reviewed and shows no abnormalities of critical concern.  Of note, the patient's sodium and potassium are acceptable.  The patient's liver enzymes are unremarkable.  The patient's renal function including creatinine is preserved.  The patient has a normal anion gap.    Patient had 2 normal high-sensitivity troponins an hour apart.  Current literature supports that the patient is low risk for acute coronary syndrome or a cardiovascular event over the next 4 weeks.  I have discussed this with the patient in detail.   They understand that they are at low risk for an acute cardiac event during that time but not necessarily no risk    Patient has normal lipase which makes acute pancreatitis unlikely    The patient's EKG demonstrated a normal sinus rhythm.  The EKG demonstrated no evidence of dysrhythmia.  The patient had no acute ST abnormalities or acute T wave abnormalities to indicate STEMI    Clinically, the patient appears to have reflux with esophagitis.  I will place the patient on Protonix and Carafate.  The patient will call his gastroenterologist tomorrow.      The patient was given very specific instructions on when and why to return to the emergency room.  The patient voiced understanding and felt comfortable with the discharge instructions.  They would return to the emergency room if necessary.  The patient appears appropriate for discharge and outpatient follow-up.         Amount and/or Complexity of Data Reviewed  Clinical lab tests: reviewed and ordered  Tests in the radiology section of CPT®: reviewed and ordered  Tests in the medicine section of CPT®: reviewed          Social Determinants of Health:    Patient is independent, reliable, and has access to care.       Disposition and Care Coordination:    Discharged: The patient is suitable and stable for discharge with no need for consideration of admission.    I have explained discharge medications and the need for follow up with the patient/caretakers. This was also printed in the discharge instructions. Patient was discharged with the following medications and follow up:      Medication List        New Prescriptions      pantoprazole 40 MG EC tablet  Commonly known as: PROTONIX  Take 1 tablet by mouth Daily for 30 days.     sucralfate 1 g tablet  Commonly known as: CARAFATE  Take 1 tablet by mouth 4 (Four) Times a Day for 14 days.               Where to Get Your Medications        These medications were sent to [id number] [place] -  [address] AT [facility] [address] [facility] [address] - [phone] PH - [phone] FX  [address] [place] [address]      Phone: [phone]   pantoprazole 40 MG EC tablet  sucralfate 1 g tablet      Kalie Arias MD  [address] [facility]  [address]  [facility] [address]  [phone]    Call on 7/29/2025      Hanane Tapia MD  [address] [place] [address]  [address]  [place] [address]  [phone]    Call on 7/29/2025         Final diagnoses:   Gastroesophageal reflux disease with esophagitis without hemorrhage        ED Disposition       ED Disposition   Discharge    Condition   Stable    Comment   --               This medical record created using voice recognition software.             Kp Dominguez DO  07/28/25 1929

## 2025-07-30 LAB
QT INTERVAL: 441 MS
QTC INTERVAL: 432 MS

## (undated) DEVICE — SOL IRRG H2O PL/BG 1000ML STRL

## (undated) DEVICE — Device: Brand: DEFENDO AIR/WATER/SUCTION AND BIOPSY VALVE

## (undated) DEVICE — SINGLE-USE BIOPSY FORCEPS: Brand: RADIAL JAW 4

## (undated) DEVICE — COLON KIT: Brand: MEDLINE INDUSTRIES, INC.